# Patient Record
Sex: MALE | Race: WHITE | NOT HISPANIC OR LATINO | Employment: FULL TIME | URBAN - METROPOLITAN AREA
[De-identification: names, ages, dates, MRNs, and addresses within clinical notes are randomized per-mention and may not be internally consistent; named-entity substitution may affect disease eponyms.]

---

## 2018-01-30 ENCOUNTER — HOSPITAL ENCOUNTER (OUTPATIENT)
Dept: FAMILY MEDICINE CLINIC | Facility: CLINIC | Age: 38
Setting detail: SPECIMEN
Discharge: HOME | End: 2018-01-30
Attending: FAMILY MEDICINE | Admitting: FAMILY MEDICINE

## 2018-01-30 LAB
ALBUMIN SERPL-MCNC: 4 G/DL (ref 3.5–4.8)
ALBUMIN/GLOB SERPL: 1.2 {RATIO} (ref 1–1.7)
ALP SERPL-CCNC: 90 IU/L (ref 32–91)
ALT SERPL-CCNC: 36 IU/L (ref 17–63)
ANION GAP SERPL CALC-SCNC: 9.9 MMOL/L (ref 10–20)
AST SERPL-CCNC: 22 IU/L (ref 15–41)
BILIRUB SERPL-MCNC: 1 MG/DL (ref 0.3–1.2)
BUN SERPL-MCNC: 14 MG/DL (ref 8–20)
BUN/CREAT SERPL: 14 (ref 6.2–20.3)
CALCIUM SERPL-MCNC: 9.2 MG/DL (ref 8.9–10.3)
CHLORIDE SERPL-SCNC: 106 MMOL/L (ref 101–111)
CHOLEST SERPL-MCNC: 174 MG/DL
CHOLEST/HDLC SERPL: 6 {RATIO}
CONV CO2: 26 MMOL/L (ref 22–32)
CONV LDL CHOLESTEROL DIRECT: 82 MG/DL (ref 0–100)
CONV TOTAL PROTEIN: 7.4 G/DL (ref 6.1–7.9)
CREAT UR-MCNC: 1 MG/DL (ref 0.7–1.2)
GLOBULIN UR ELPH-MCNC: 3.4 G/DL (ref 2.5–3.8)
GLUCOSE SERPL-MCNC: 94 MG/DL (ref 65–99)
HDLC SERPL-MCNC: 29 MG/DL
LDLC/HDLC SERPL: 2.8 {RATIO}
LIPID INTERPRETATION: ABNORMAL
POTASSIUM SERPL-SCNC: 3.9 MMOL/L (ref 3.6–5.1)
SODIUM SERPL-SCNC: 138 MMOL/L (ref 136–144)
TRIGL SERPL-MCNC: 225 MG/DL
VLDLC SERPL CALC-MCNC: 63.3 MG/DL

## 2018-12-04 ENCOUNTER — CONVERSION ENCOUNTER (OUTPATIENT)
Dept: FAMILY MEDICINE CLINIC | Facility: CLINIC | Age: 38
End: 2018-12-04

## 2018-12-04 LAB
ALBUMIN SERPL-MCNC: 4.4 G/DL (ref 3.6–5.1)
ALBUMIN/GLOB SERPL: ABNORMAL {RATIO} (ref 1–2.5)
ALP SERPL-CCNC: 105 UNITS/L (ref 40–115)
ALT SERPL-CCNC: 21 UNITS/L (ref 9–46)
AST SERPL-CCNC: 16 UNITS/L (ref 10–40)
BILIRUB SERPL-MCNC: 0.7 MG/DL (ref 0.2–1.2)
BUN SERPL-MCNC: 17 MG/DL (ref 7–25)
BUN/CREAT SERPL: ABNORMAL (ref 6–22)
CALCIUM SERPL-MCNC: 9.5 MG/DL (ref 8.6–10.3)
CHLORIDE SERPL-SCNC: 104 MMOL/L (ref 98–110)
CHOLEST SERPL-MCNC: 197 MG/DL
CHOLEST/HDLC SERPL: ABNORMAL {RATIO}
CO2 CONTENT VENOUS: 29 MMOL/L (ref 20–32)
CONV TOTAL PROTEIN: 7.4 G/DL (ref 6.1–8.1)
CREAT UR-MCNC: 1.07 MG/DL (ref 0.6–1.35)
GLOBULIN UR ELPH-MCNC: ABNORMAL G/DL (ref 1.9–3.7)
GLUCOSE SERPL-MCNC: 95 MG/DL (ref 65–99)
HDLC SERPL-MCNC: 31 MG/DL
LDLC SERPL CALC-MCNC: ABNORMAL MG/DL
POTASSIUM SERPL-SCNC: 4.4 MMOL/L (ref 3.5–5.3)
SODIUM SERPL-SCNC: 141 MMOL/L (ref 135–146)
TRIGL SERPL-MCNC: 233 MG/DL

## 2020-01-16 ENCOUNTER — OFFICE VISIT (OUTPATIENT)
Dept: FAMILY MEDICINE CLINIC | Facility: CLINIC | Age: 40
End: 2020-01-16

## 2020-01-16 VITALS
BODY MASS INDEX: 28.19 KG/M2 | DIASTOLIC BLOOD PRESSURE: 82 MMHG | OXYGEN SATURATION: 98 % | HEART RATE: 74 BPM | HEIGHT: 71 IN | TEMPERATURE: 97.6 F | SYSTOLIC BLOOD PRESSURE: 124 MMHG | WEIGHT: 201.4 LBS

## 2020-01-16 DIAGNOSIS — Z00.00 ENCOUNTER FOR WELL ADULT EXAM WITHOUT ABNORMAL FINDINGS: Primary | ICD-10-CM

## 2020-01-16 DIAGNOSIS — G47.39 SLEEP APNEA-LIKE BEHAVIOR: ICD-10-CM

## 2020-01-16 PROCEDURE — 99395 PREV VISIT EST AGE 18-39: CPT | Performed by: FAMILY MEDICINE

## 2020-01-16 RX ORDER — PIMECROLIMUS 10 MG/G
CREAM TOPICAL
COMMUNITY
Start: 2011-09-13 | End: 2023-03-29

## 2020-01-16 NOTE — PROGRESS NOTES
"Subjective   Camron Rodriguez is a 39 y.o. male.     He is in today for annual well visit.  He has no significant concerns today.  He denies any issues with chest pain or difficulty breathing.  He is an avid runner and exercises often.  He feels he has good nutrition.  He denies any issues with bowel or bladder function.  He does have chronic IBS, so there are certain foods that will trigger some diarrhea, but it is still predictable and unchanged.  He thought he was due for colonoscopy this year but it is actually due next year.  He denies any issues with sleep or mood.  He denies any difficulties doing his activities of daily living.  His knees do pop occasionally with activity but there is no pain associated with it and he does not feel limited in his mobility.         /82 (BP Location: Right arm, Patient Position: Sitting, Cuff Size: Small Adult)   Pulse 74   Temp 97.6 °F (36.4 °C) (Oral)   Ht 180.3 cm (71\")   Wt 91.4 kg (201 lb 6.4 oz)   SpO2 98%   BMI 28.09 kg/m²       Chief Complaint   Patient presents with   • Annual Exam     He is in for his annual physical exam. Due for colonoscopy. C/o right knee popping but no pain            Current Outpatient Medications:   •  pimecrolimus (ELIDEL) 1 % cream, ELIDEL 1 % CREA, Disp: , Rfl:         The following portions of the patient's history were reviewed and updated as appropriate: allergies, current medications, past family history, past medical history, past social history, past surgical history and problem list.    Review of Systems   Constitutional: Negative for activity change, fatigue and fever.   HENT: Negative for congestion, sinus pressure, sinus pain, sore throat and trouble swallowing.    Eyes: Negative for visual disturbance.   Respiratory: Negative for chest tightness, shortness of breath and wheezing.    Cardiovascular: Negative for chest pain.   Gastrointestinal: Positive for diarrhea. Negative for abdominal distention, abdominal pain, " constipation, nausea and vomiting.   Genitourinary: Negative for difficulty urinating, dysuria, frequency and urgency.   Musculoskeletal: Negative for arthralgias, back pain and neck pain.   Neurological: Negative for dizziness, weakness, light-headedness, numbness and headaches.   Psychiatric/Behavioral: Negative for agitation, hallucinations, sleep disturbance and suicidal ideas.       Objective   Physical Exam   Constitutional: He is oriented to person, place, and time. No distress.   HENT:   Mouth/Throat: No oropharyngeal exudate.   Neck: Normal range of motion. Neck supple. No thyromegaly present.   Cardiovascular: Normal rate, regular rhythm and normal heart sounds.   No murmur heard.  Pulmonary/Chest: Effort normal and breath sounds normal. He has no wheezes.   Abdominal: Soft. Bowel sounds are normal. There is no guarding.   Musculoskeletal: Normal range of motion. He exhibits no edema or deformity.   Lymphadenopathy:     He has no cervical adenopathy.   Neurological: He is alert and oriented to person, place, and time. He displays normal reflexes.   Skin: Skin is warm. No rash noted.   Psychiatric: He has a normal mood and affect.   Nursing note and vitals reviewed.        Assessment/Plan   Problems Addressed this Visit     None      Visit Diagnoses     Encounter for well adult exam without abnormal findings    -  Primary    Relevant Orders    Comprehensive metabolic panel    Lipid panel    Sleep apnea-like behavior        Relevant Orders    Ambulatory Referral to Sleep Medicine    CBC w AUTO Differential        I have asked him to get a sleep study he said that he was having some apnea like issues that his wife had noticed even though he feels no symptoms  I will get appropriate labs  I will see him back in the office yearly for well visits and in between as needed

## 2020-02-04 ENCOUNTER — OFFICE VISIT (OUTPATIENT)
Dept: SLEEP MEDICINE | Facility: CLINIC | Age: 40
End: 2020-02-04

## 2020-02-04 VITALS
SYSTOLIC BLOOD PRESSURE: 121 MMHG | HEART RATE: 72 BPM | HEIGHT: 71 IN | OXYGEN SATURATION: 97 % | DIASTOLIC BLOOD PRESSURE: 79 MMHG | WEIGHT: 199 LBS | BODY MASS INDEX: 27.86 KG/M2

## 2020-02-04 DIAGNOSIS — G47.30 OBSERVED SLEEP APNEA: Primary | ICD-10-CM

## 2020-02-04 DIAGNOSIS — G47.10 HYPERSOMNIA: ICD-10-CM

## 2020-02-04 DIAGNOSIS — R06.83 SNORING: ICD-10-CM

## 2020-02-04 PROCEDURE — 99204 OFFICE O/P NEW MOD 45 MIN: CPT | Performed by: INTERNAL MEDICINE

## 2020-02-04 PROCEDURE — G0463 HOSPITAL OUTPT CLINIC VISIT: HCPCS

## 2020-02-04 NOTE — PROGRESS NOTES
[facility]  [address], [place] [address]  Phone   Fax       Camron Rodriguez  1980  39 y.o.  male      Referring Provider and PCP Camron Shields MD    Type of service: Initial consult  Date of service: 2/4/2020      Chief Complaint   Patient presents with   • Witnessed Apnea   • Snoring   • Daytime Sleepiness       History of present illness;  Thank you for asking to see Camron Rodriguez, 39 y.o.  for evaluation of sleep apnea. The patient was seen today on 2/4/2020 at [facility].   Patient reports that he has significant snoring and he has to sleep in a separate room because of the snoring.  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  He has a quite a bit of travel and he travels almost a week and a month.  Wife has noted that he stops breathing wakes up choking and gasping for breath    Patient gives the following sleep history.  Sleep schedule:  Bedtime: Midnight  Wake time: 7 AM  Normally takes about 5-10 minutes to fall asleep  Average hours of sleep 6-7  Number of naps per day yes  When patient wakes up still feels tired and not rested  Snoring; yes  Witnessed apneas; yes  Have you ever awakened gasping for breath, coughing, choking: Yes      Past Medical History:   Diagnosis Date   • Acute pharyngitis    • Anxiety    • Snoring    • Stool contents finding, abnormal        Social history:  Shift work: No  Tobacco use: No  Alcohol use: No  Caffeinated drinks: 1-2  Over-the-counter sleeping aid and medications: None  Narcotic medications: None    Family Hx  Family history of sleep apnea, mother has sleep apnea  Family History   Problem Relation Age of Onset   • Diabetes Maternal Grandmother    • Heart attack Maternal Grandfather         MI X2   • Other Maternal Grandfather         CABG   • Colon cancer Other    • Prostate cancer Other   "      Medications: reviewed    Current Outpatient Medications:   •  pimecrolimus (ELIDEL) 1 % cream, ELIDEL 1 % CREA, Disp: , Rfl:     Review of systems:  [place] Sleepiness Scale: Total score: 8   Positive for snoring, witnessed apneas, fatigue and daytime excessive sleepiness,   Negative for shortness of breath, cough, wheezing, chest pain, nausea and vomiting, palpitation, swelling of feet:    Morning headaches: No  Awaken with sore throat or dry mouth : Yes  Leg jerking at night: No  Crawly feeling/urge sensation to move in the legs: No  Teeth grinding: None  Sleepwalking, nightmares, muscle weakness with laughing or anger,sleep paralysis: None  Nasal Congestion: No  Nocturia (how many times/night): 1  Memory Problem: No  Change in weight, no    Physical exam:  Vitals:    02/04/20 0836   BP: 121/79   Pulse: 72   SpO2: 97%   Weight: 90.3 kg (199 lb)   Height: 180.3 cm (71\")    Body mass index is 27.75 kg/m². Neck Circumference: 15.75 inches  HEENT: Head is atraumatic, normocephalic   Eyes:pupils are round equal and reacting to light and accommodation, conjunctiva normal  Nose:no nasal septal defects or deviation and the nasal passages are clear, no nasal polyps,   Throat: tonsils are not enlarged, tongue normal size, oral airway Mallampati class 3  NECK: No lymphadenopathy, trachea is in the midline, thyroid not enlarged  RESPIRATORY SYSTEM: Breath sounds are equal on both sides, there are no wheezes or crackles  CARDIOVASULAR SYSTEM: Heart sounds are regular rhythm and vinicius rate, there are no murmurs or thrills  ABDOMEN: Soft, no hepatosplenomegaly, no evidence of ascites  EXTREMITES: No cyanosis, clubbing or edema   NEUROLOGICAL SYSTEM: Oriented x 3, no gross neurological defects, gait normal    Medical records and labs reviewed in [facility] reviewed    Assessment and plan:  · Sleep apnea unspecified, (G47.30) Patient's symptoms and examination is consistent with sleep apnea.  I have talked to the patient about the " signs and symptoms of sleep apnea and consequences of untreated sleep apnea. Discussed sleep testing.  I have placed a order in epic for a home sleep test.  Patient will have a follow-up after this sleep test is done.   · Overweight, patient's BMI is Body mass index is 27.75 kg/m².. I have discussed the relationship between weight and sleep apnea.There is direct correction between weight and severity of sleep apnea.  Weight reduction is encouraged. I have also discussed with the patient diet and exercise.  · Snoring secondary to sleep apnea  · Hypersomnia with [place] Sleepiness Scale of Total score: 8 due to sleep apnea.      I once again thank you for asking me to see this patient in consultation and I have forwarded my opinion and treatment plan.  Please do not hesitate to call me if you have any questions.     Sirisha Kelley MD, [facility]  Sleep Medicine.(Board-certified)  [facility]  Medical Director for [facility]  2/4/2020 ,

## 2020-02-15 ENCOUNTER — HOSPITAL ENCOUNTER (OUTPATIENT)
Dept: SLEEP MEDICINE | Facility: HOSPITAL | Age: 40
Discharge: HOME | End: 2020-02-15
Admitting: INTERNAL MEDICINE

## 2020-02-15 VITALS — OXYGEN SATURATION: 96 % | HEART RATE: 86 BPM | RESPIRATION RATE: 12 BRPM

## 2020-02-15 DIAGNOSIS — G47.10 HYPERSOMNIA: ICD-10-CM

## 2020-02-15 DIAGNOSIS — G47.30 OBSERVED SLEEP APNEA: ICD-10-CM

## 2020-02-15 DIAGNOSIS — R06.83 SNORING: ICD-10-CM

## 2020-02-15 PROCEDURE — 95806 SLEEP STUDY UNATT&RESP EFFT: CPT | Performed by: INTERNAL MEDICINE

## 2020-02-15 PROCEDURE — 95806 SLEEP STUDY UNATT&RESP EFFT: CPT

## 2020-02-18 DIAGNOSIS — G47.33 OSA (OBSTRUCTIVE SLEEP APNEA): Primary | ICD-10-CM

## 2020-02-18 DIAGNOSIS — R06.83 SNORING: ICD-10-CM

## 2020-02-20 LAB
ALBUMIN SERPL-MCNC: 4.3 G/DL (ref 3.5–5.2)
ALBUMIN/GLOB SERPL: 1.4 G/DL
ALP SERPL-CCNC: 97 U/L (ref 39–117)
ALT SERPL W P-5'-P-CCNC: 24 U/L (ref 1–41)
ANION GAP SERPL CALCULATED.3IONS-SCNC: 13.3 MMOL/L (ref 5–15)
AST SERPL-CCNC: 21 U/L (ref 1–40)
BASOPHILS # BLD AUTO: 0.03 10*3/MM3 (ref 0–0.2)
BASOPHILS NFR BLD AUTO: 0.5 % (ref 0–1.5)
BILIRUB SERPL-MCNC: 0.5 MG/DL (ref 0.2–1.2)
BUN BLD-MCNC: 18 MG/DL (ref 6–20)
BUN/CREAT SERPL: 17.3 (ref 7–25)
CALCIUM SPEC-SCNC: 9 MG/DL (ref 8.6–10.5)
CHLORIDE SERPL-SCNC: 103 MMOL/L (ref 98–107)
CHOLEST SERPL-MCNC: 201 MG/DL (ref 0–200)
CO2 SERPL-SCNC: 24.7 MMOL/L (ref 22–29)
CREAT BLD-MCNC: 1.04 MG/DL (ref 0.76–1.27)
DEPRECATED RDW RBC AUTO: 40 FL (ref 37–54)
EOSINOPHIL # BLD AUTO: 0.07 10*3/MM3 (ref 0–0.4)
EOSINOPHIL NFR BLD AUTO: 1.1 % (ref 0.3–6.2)
ERYTHROCYTE [DISTWIDTH] IN BLOOD BY AUTOMATED COUNT: 12.3 % (ref 12.3–15.4)
GFR SERPL CREATININE-BSD FRML MDRD: 79 ML/MIN/1.73
GLOBULIN UR ELPH-MCNC: 3.1 GM/DL
GLUCOSE BLD-MCNC: 96 MG/DL (ref 65–99)
HCT VFR BLD AUTO: 44.2 % (ref 37.5–51)
HDLC SERPL-MCNC: 30 MG/DL (ref 40–60)
HGB BLD-MCNC: 15.4 G/DL (ref 13–17.7)
IMM GRANULOCYTES # BLD AUTO: 0.03 10*3/MM3 (ref 0–0.05)
IMM GRANULOCYTES NFR BLD AUTO: 0.5 % (ref 0–0.5)
LDLC SERPL CALC-MCNC: 138 MG/DL (ref 0–100)
LDLC/HDLC SERPL: 4.61 {RATIO}
LYMPHOCYTES # BLD AUTO: 2.36 10*3/MM3 (ref 0.7–3.1)
LYMPHOCYTES NFR BLD AUTO: 36.5 % (ref 19.6–45.3)
MCH RBC QN AUTO: 31.6 PG (ref 26.6–33)
MCHC RBC AUTO-ENTMCNC: 34.8 G/DL (ref 31.5–35.7)
MCV RBC AUTO: 90.6 FL (ref 79–97)
MONOCYTES # BLD AUTO: 0.66 10*3/MM3 (ref 0.1–0.9)
MONOCYTES NFR BLD AUTO: 10.2 % (ref 5–12)
NEUTROPHILS # BLD AUTO: 3.31 10*3/MM3 (ref 1.7–7)
NEUTROPHILS NFR BLD AUTO: 51.2 % (ref 42.7–76)
NRBC BLD AUTO-RTO: 0 /100 WBC (ref 0–0.2)
PLATELET # BLD AUTO: 276 10*3/MM3 (ref 140–450)
PMV BLD AUTO: 10 FL (ref 6–12)
POTASSIUM BLD-SCNC: 3.7 MMOL/L (ref 3.5–5.2)
PROT SERPL-MCNC: 7.4 G/DL (ref 6–8.5)
RBC # BLD AUTO: 4.88 10*6/MM3 (ref 4.14–5.8)
SODIUM BLD-SCNC: 141 MMOL/L (ref 136–145)
TRIGL SERPL-MCNC: 163 MG/DL (ref 0–150)
VLDLC SERPL-MCNC: 32.6 MG/DL (ref 5–40)
WBC NRBC COR # BLD: 6.46 10*3/MM3 (ref 3.4–10.8)

## 2020-02-20 PROCEDURE — 80061 LIPID PANEL: CPT | Performed by: FAMILY MEDICINE

## 2020-02-20 PROCEDURE — 85025 COMPLETE CBC W/AUTO DIFF WBC: CPT | Performed by: FAMILY MEDICINE

## 2020-02-20 PROCEDURE — 80053 COMPREHEN METABOLIC PANEL: CPT | Performed by: FAMILY MEDICINE

## 2020-02-20 PROCEDURE — 36415 COLL VENOUS BLD VENIPUNCTURE: CPT | Performed by: FAMILY MEDICINE

## 2020-02-23 RX ORDER — ROSUVASTATIN CALCIUM 10 MG/1
10 TABLET, COATED ORAL DAILY
Qty: 90 TABLET | Refills: 1 | Status: SHIPPED | OUTPATIENT
Start: 2020-02-23 | End: 2020-09-30

## 2020-06-05 ENCOUNTER — TELEPHONE (OUTPATIENT)
Dept: FAMILY MEDICINE CLINIC | Facility: CLINIC | Age: 40
End: 2020-06-05

## 2020-07-14 ENCOUNTER — OFFICE VISIT (OUTPATIENT)
Dept: SLEEP MEDICINE | Facility: CLINIC | Age: 40
End: 2020-07-14

## 2020-07-14 VITALS
SYSTOLIC BLOOD PRESSURE: 108 MMHG | HEART RATE: 58 BPM | HEIGHT: 71 IN | OXYGEN SATURATION: 99 % | DIASTOLIC BLOOD PRESSURE: 75 MMHG | WEIGHT: 198 LBS | BODY MASS INDEX: 27.72 KG/M2

## 2020-07-14 DIAGNOSIS — Z99.89 OSA ON CPAP: Primary | ICD-10-CM

## 2020-07-14 DIAGNOSIS — G47.33 OSA ON CPAP: Primary | ICD-10-CM

## 2020-07-14 PROBLEM — G47.10 HYPERSOMNIA: Status: RESOLVED | Noted: 2020-02-04 | Resolved: 2020-07-14

## 2020-07-14 PROBLEM — R06.83 SNORING: Status: RESOLVED | Noted: 2020-02-04 | Resolved: 2020-07-14

## 2020-07-14 PROCEDURE — 99213 OFFICE O/P EST LOW 20 MIN: CPT | Performed by: INTERNAL MEDICINE

## 2020-07-14 PROCEDURE — G0463 HOSPITAL OUTPT CLINIC VISIT: HCPCS

## 2020-07-14 NOTE — PROGRESS NOTES
"  [facility]  [address], [place] [address]  Phone   Fax       SLEEP CLINIC FOLLOW UP PROGRESS NOTE.    Camron Rodriguez  1980  40 y.o.  male      PCP: Camron Shields MD      Date of visit: 7/14/2020    Chief Complaint   Patient presents with   • Follow-up   • Sleep Apnea       INTERM HISTORY:  This is a 40 y.o. years old patient who has a history of sleep apnea and is on CPAP.  Patient reports good compliance with the device.  Patient tolerated the difference after starting CPAP    Sleep schedule  Normally goes to bed at midnight  Wakes up at 8 AM  Do you feel refreshed after waking up ?: yes      The Smart card downloaded on 7/14/2020 has been reviewed by me and shows the following..  Compliance; 79 %  > 4 hr use, 77 %  Average use of the device 7 hours and 27 per night  Residual AHI: 9.3 /hr (normal less than 5)  Mask type: Face mask dream wear  DME: Leila Smith      Past Medical History:   Diagnosis Date   • Acute pharyngitis    • Anxiety    • HELDER on CPAP     AHI:16.5   • Stool contents finding, abnormal        MEDICATIONS: reviewed by me    Current Outpatient Medications:   •  pimecrolimus (ELIDEL) 1 % cream, ELIDEL 1 % CREA, Disp: , Rfl:   •  rosuvastatin (CRESTOR) 10 MG tablet, Take 1 tablet by mouth Daily., Disp: 90 tablet, Rfl: 1    No Known Allergies reviewed by me    SOCIAL, FAMILY HISTORY: Medical records are reviewed and noted by me.  History of smoking NO  History of alcohol use yes  Caffeine use 1-2    REVIEW OF SYSTEMS:   [place] Sleepiness Scale :Total score: 3   Snoring: Resolved  Morning headache: No  Nasal congestion: No  Leg movements: No  Number of times you wake up once asleep: None  Heart burn no    PHYSICAL EXAMINATION:  Vitals:    07/14/20 0830   BP: 108/75   Pulse: 58   SpO2: 99%   Weight: 89.8 kg (198 lb)   Height: 180.3 cm (71\")    Body mass index is 27.62 kg/m².    HEENT: pupils are round equal and reacting to light " and accommodation, nasal passage is clear, no nasal polyps, no lymphadenopathy, throat is clear, oral airway Mallampati class 3  RESPRATORY SYSTEM: Breath sounds are equal on both sides and are normal, no wheezes or crackles  CARDIOVASULAR SYSTEM: Heart rate is regular without murmur  ABDOMEN: Soft, no ascites, no hepatosplenomegaly.  EXTREMITIES: No cyanosis, clubbing or edema       ASSESSMENT AND PLAN:  · Obstructive sleep apnea, patient is using the device with good compliance for treatment of sleep apnea.  I have reviewed the smart card down load and discussed with the patient the download data and encouarged the patient to continue to use the device.  The symptoms have improved and the residual AHI is acceptable.  The device is benefiting the patient and the device is medically necessary. The patient is also instructed to get the supplies from the [employer] company and a prescription for supplies has been sent to the DME company.  AHI is slightly high at 9.3.  The leak is minimal.  I have asked the patient to watch for few weeks and if this number is still consistently high he is a instructed to call so that they can increase the pressure  · Return to clinic in 12 months for follow-up        Sirisha Kelley MD, [facility]  Sleep Medicine.(Board-certified)  [facility]  Medical Director for [facility]  7/14/2020

## 2020-09-30 RX ORDER — ROSUVASTATIN CALCIUM 10 MG/1
10 TABLET, COATED ORAL DAILY
Qty: 90 TABLET | Refills: 1 | Status: SHIPPED | OUTPATIENT
Start: 2020-09-30 | End: 2023-04-02 | Stop reason: SDUPTHER

## 2021-01-19 ENCOUNTER — OFFICE VISIT (OUTPATIENT)
Dept: FAMILY MEDICINE CLINIC | Facility: CLINIC | Age: 41
End: 2021-01-19

## 2021-01-19 VITALS
HEART RATE: 89 BPM | TEMPERATURE: 97.8 F | SYSTOLIC BLOOD PRESSURE: 136 MMHG | WEIGHT: 199 LBS | OXYGEN SATURATION: 96 % | DIASTOLIC BLOOD PRESSURE: 79 MMHG | BODY MASS INDEX: 27.86 KG/M2 | HEIGHT: 71 IN

## 2021-01-19 DIAGNOSIS — E78.5 HYPERLIPIDEMIA, UNSPECIFIED HYPERLIPIDEMIA TYPE: ICD-10-CM

## 2021-01-19 DIAGNOSIS — Z00.00 ENCOUNTER FOR WELL ADULT EXAM WITHOUT ABNORMAL FINDINGS: Primary | ICD-10-CM

## 2021-01-19 PROCEDURE — 99396 PREV VISIT EST AGE 40-64: CPT | Performed by: FAMILY MEDICINE

## 2021-01-19 NOTE — PROGRESS NOTES
"Subjective   Camron Rodriguez is a 40 y.o. male.     He is in for his annual well visit. He has not had any immediate concerns. He is due for labs to monitor cholesterol. He is also due for colonoscopy for surveillance of polyps; he has the order already and just has not scheduled it yet.         /79 (BP Location: Left arm, Patient Position: Sitting, Cuff Size: Large Adult)   Pulse 89   Temp 97.8 °F (36.6 °C) (Temporal)   Ht 180 cm (70.87\")   Wt 90.3 kg (199 lb)   SpO2 96%   BMI 27.86 kg/m²       Chief Complaint   Patient presents with   • Annual Exam     due for a colo 5 yrs since the last one - had COVID back on 11/01/2020           Current Outpatient Medications:   •  pimecrolimus (ELIDEL) 1 % cream, ELIDEL 1 % CREA, Disp: , Rfl:   •  rosuvastatin (CRESTOR) 10 MG tablet, TAKE 1 TABLET BY MOUTH DAILY, Disp: 90 tablet, Rfl: 1        The following portions of the patient's history were reviewed and updated as appropriate: allergies, current medications, past family history, past medical history, past social history, past surgical history and problem list.    Review of Systems   Constitutional: Negative for activity change, fatigue and fever.   HENT: Negative for congestion, sinus pressure, sinus pain, sore throat and trouble swallowing.    Eyes: Negative for visual disturbance.   Respiratory: Negative for chest tightness, shortness of breath and wheezing.    Cardiovascular: Negative for chest pain.   Gastrointestinal: Negative for abdominal distention, abdominal pain, constipation, diarrhea, nausea and vomiting.   Genitourinary: Negative for difficulty urinating, dysuria, frequency and urgency.   Musculoskeletal: Negative for arthralgias, back pain and neck pain.   Skin: Negative for rash.   Neurological: Negative for dizziness, weakness, light-headedness, numbness and headaches.   Psychiatric/Behavioral: Negative for agitation, hallucinations, sleep disturbance and suicidal ideas.       Objective "   Physical Exam  Vitals signs and nursing note reviewed.   Constitutional:       Appearance: Normal appearance.   HENT:      Right Ear: Tympanic membrane, ear canal and external ear normal.      Left Ear: Tympanic membrane, ear canal and external ear normal.      Nose: Nose normal. No congestion.      Mouth/Throat:      Pharynx: Oropharynx is clear. No oropharyngeal exudate.   Eyes:      Conjunctiva/sclera: Conjunctivae normal.      Pupils: Pupils are equal, round, and reactive to light.   Neck:      Musculoskeletal: Neck supple.   Cardiovascular:      Rate and Rhythm: Normal rate and regular rhythm.      Heart sounds: Normal heart sounds.   Pulmonary:      Effort: Pulmonary effort is normal.      Breath sounds: Normal breath sounds. No wheezing or rales.   Abdominal:      General: Bowel sounds are normal.      Palpations: Abdomen is soft.   Musculoskeletal:         General: No swelling or signs of injury.      Right lower leg: No edema.      Left lower leg: No edema.   Skin:     Findings: No rash.   Neurological:      General: No focal deficit present.      Mental Status: He is alert and oriented to person, place, and time.      Deep Tendon Reflexes: Reflexes normal.   Psychiatric:         Mood and Affect: Mood normal.           Assessment/Plan   Problems Addressed this Visit     None      Visit Diagnoses     Encounter for well adult exam without abnormal findings    -  Primary    Hyperlipidemia, unspecified hyperlipidemia type        Relevant Orders    Comprehensive metabolic panel    Lipid panel      Diagnoses       Codes Comments    Encounter for well adult exam without abnormal findings    -  Primary ICD-10-CM: Z00.00  ICD-9-CM: V70.0     Hyperlipidemia, unspecified hyperlipidemia type     ICD-10-CM: E78.5  ICD-9-CM: 272.4         I will follow up on labs  I advised him to remain active and call for concerns  He is to see me yearly for well visits and as needed in the interim  He was advised to get his  colonoscopy soon and get a Covid vaccine when offered, likely this spring or summer

## 2021-01-29 ENCOUNTER — LAB (OUTPATIENT)
Dept: FAMILY MEDICINE CLINIC | Facility: CLINIC | Age: 41
End: 2021-01-29

## 2021-01-29 LAB
ALBUMIN SERPL-MCNC: 4.5 G/DL (ref 3.5–5.2)
ALBUMIN/GLOB SERPL: 1.3 G/DL
ALP SERPL-CCNC: 97 U/L (ref 39–117)
ALT SERPL W P-5'-P-CCNC: 28 U/L (ref 1–41)
ANION GAP SERPL CALCULATED.3IONS-SCNC: 12.1 MMOL/L (ref 5–15)
AST SERPL-CCNC: 18 U/L (ref 1–40)
BILIRUB SERPL-MCNC: 0.7 MG/DL (ref 0–1.2)
BUN SERPL-MCNC: 15 MG/DL (ref 6–20)
BUN/CREAT SERPL: 13.5 (ref 7–25)
CALCIUM SPEC-SCNC: 9.8 MG/DL (ref 8.6–10.5)
CHLORIDE SERPL-SCNC: 102 MMOL/L (ref 98–107)
CHOLEST SERPL-MCNC: 159 MG/DL (ref 0–200)
CO2 SERPL-SCNC: 27.9 MMOL/L (ref 22–29)
CREAT SERPL-MCNC: 1.11 MG/DL (ref 0.76–1.27)
GFR SERPL CREATININE-BSD FRML MDRD: 73 ML/MIN/1.73
GLOBULIN UR ELPH-MCNC: 3.4 GM/DL
GLUCOSE SERPL-MCNC: 106 MG/DL (ref 65–99)
HDLC SERPL-MCNC: 34 MG/DL (ref 40–60)
LDLC SERPL CALC-MCNC: 105 MG/DL (ref 0–100)
LDLC/HDLC SERPL: 3.03 {RATIO}
POTASSIUM SERPL-SCNC: 4.2 MMOL/L (ref 3.5–5.2)
PROT SERPL-MCNC: 7.9 G/DL (ref 6–8.5)
SODIUM SERPL-SCNC: 142 MMOL/L (ref 136–145)
TRIGL SERPL-MCNC: 110 MG/DL (ref 0–150)
VLDLC SERPL-MCNC: 20 MG/DL (ref 5–40)

## 2021-01-29 PROCEDURE — 80061 LIPID PANEL: CPT | Performed by: FAMILY MEDICINE

## 2021-01-29 PROCEDURE — 36415 COLL VENOUS BLD VENIPUNCTURE: CPT | Performed by: FAMILY MEDICINE

## 2021-01-29 PROCEDURE — 80053 COMPREHEN METABOLIC PANEL: CPT | Performed by: FAMILY MEDICINE

## 2021-02-02 ENCOUNTER — TELEPHONE (OUTPATIENT)
Dept: FAMILY MEDICINE CLINIC | Facility: CLINIC | Age: 41
End: 2021-02-02

## 2021-02-02 NOTE — TELEPHONE ENCOUNTER
Spoke to Camron and informed him the wellness form is completed. He requested it me E-mailed to him at [email or web address].

## 2021-04-28 ENCOUNTER — OFFICE (AMBULATORY)
Dept: URBAN - METROPOLITAN AREA PATHOLOGY 4 | Facility: PATHOLOGY | Age: 41
End: 2021-04-28

## 2021-04-28 ENCOUNTER — ON CAMPUS - OUTPATIENT (AMBULATORY)
Dept: URBAN - METROPOLITAN AREA HOSPITAL 2 | Facility: HOSPITAL | Age: 41
End: 2021-04-28

## 2021-04-28 ENCOUNTER — OFFICE (AMBULATORY)
Dept: URBAN - METROPOLITAN AREA PATHOLOGY 4 | Facility: PATHOLOGY | Age: 41
End: 2021-04-28
Payer: COMMERCIAL

## 2021-04-28 VITALS
OXYGEN SATURATION: 100 % | DIASTOLIC BLOOD PRESSURE: 72 MMHG | HEIGHT: 71 IN | DIASTOLIC BLOOD PRESSURE: 59 MMHG | HEART RATE: 69 BPM | SYSTOLIC BLOOD PRESSURE: 101 MMHG | HEART RATE: 77 BPM | DIASTOLIC BLOOD PRESSURE: 68 MMHG | OXYGEN SATURATION: 95 % | OXYGEN SATURATION: 94 % | DIASTOLIC BLOOD PRESSURE: 63 MMHG | SYSTOLIC BLOOD PRESSURE: 115 MMHG | OXYGEN SATURATION: 99 % | WEIGHT: 193 LBS | SYSTOLIC BLOOD PRESSURE: 96 MMHG | SYSTOLIC BLOOD PRESSURE: 108 MMHG | DIASTOLIC BLOOD PRESSURE: 75 MMHG | HEART RATE: 71 BPM | RESPIRATION RATE: 16 BRPM | SYSTOLIC BLOOD PRESSURE: 114 MMHG | SYSTOLIC BLOOD PRESSURE: 128 MMHG | OXYGEN SATURATION: 96 % | DIASTOLIC BLOOD PRESSURE: 88 MMHG | HEART RATE: 78 BPM | HEART RATE: 79 BPM | HEART RATE: 86 BPM | SYSTOLIC BLOOD PRESSURE: 103 MMHG | RESPIRATION RATE: 18 BRPM | TEMPERATURE: 98 F

## 2021-04-28 DIAGNOSIS — K63.5 POLYP OF COLON: ICD-10-CM

## 2021-04-28 DIAGNOSIS — K52.9 NONINFECTIVE GASTROENTERITIS AND COLITIS, UNSPECIFIED: ICD-10-CM

## 2021-04-28 DIAGNOSIS — D12.8 BENIGN NEOPLASM OF RECTUM: ICD-10-CM

## 2021-04-28 DIAGNOSIS — K57.30 DIVERTICULOSIS OF LARGE INTESTINE WITHOUT PERFORATION OR ABS: ICD-10-CM

## 2021-04-28 DIAGNOSIS — D12.2 BENIGN NEOPLASM OF ASCENDING COLON: ICD-10-CM

## 2021-04-28 DIAGNOSIS — K62.1 RECTAL POLYP: ICD-10-CM

## 2021-04-28 DIAGNOSIS — R19.7 DIARRHEA, UNSPECIFIED: ICD-10-CM

## 2021-04-28 LAB
GI HISTOLOGY: A. UNSPECIFIED: (no result)
GI HISTOLOGY: B. UNSPECIFIED: (no result)
GI HISTOLOGY: C. UNSPECIFIED: (no result)
GI HISTOLOGY: PDF REPORT: (no result)

## 2021-04-28 PROCEDURE — 45380 COLONOSCOPY AND BIOPSY: CPT | Performed by: INTERNAL MEDICINE

## 2021-04-28 PROCEDURE — 88305 TISSUE EXAM BY PATHOLOGIST: CPT | Mod: 26 | Performed by: INTERNAL MEDICINE

## 2021-11-12 ENCOUNTER — TELEPHONE (OUTPATIENT)
Dept: FAMILY MEDICINE CLINIC | Facility: CLINIC | Age: 41
End: 2021-11-12

## 2021-11-12 NOTE — TELEPHONE ENCOUNTER
Caller: Camron Rodriguez    Relationship to patient: Self    Best call back number: [phone]     Chief complaint: PHYSICAL    Type of visit: PHYSICAL    Requested date: NOV, DEC BEFORE END OF YEAR    If rescheduling, when is the original appointment: N/A    Additional notes: PATIENT TRIED TO SCHEDULE A PHYSICAL AND I INFORMED HIM HE WASN'T DUE FOR A PHYSICAL UNTIL 1-2022 AND HE STATED HE STILL WANTS ONE AND WILL PAY OUT OF POCKET. PLEASE CALL AND ADVISE.

## 2021-12-06 ENCOUNTER — LAB (OUTPATIENT)
Dept: FAMILY MEDICINE CLINIC | Facility: CLINIC | Age: 41
End: 2021-12-06

## 2021-12-06 ENCOUNTER — OFFICE VISIT (OUTPATIENT)
Dept: FAMILY MEDICINE CLINIC | Facility: CLINIC | Age: 41
End: 2021-12-06

## 2021-12-06 VITALS
TEMPERATURE: 99.5 F | HEIGHT: 71 IN | SYSTOLIC BLOOD PRESSURE: 119 MMHG | HEART RATE: 77 BPM | RESPIRATION RATE: 16 BRPM | OXYGEN SATURATION: 99 % | DIASTOLIC BLOOD PRESSURE: 80 MMHG | BODY MASS INDEX: 28.7 KG/M2 | WEIGHT: 205 LBS

## 2021-12-06 DIAGNOSIS — Z23 NEED FOR VACCINATION: ICD-10-CM

## 2021-12-06 DIAGNOSIS — Z00.00 ROUTINE PHYSICAL EXAMINATION: Primary | ICD-10-CM

## 2021-12-06 LAB
ALBUMIN SERPL-MCNC: 4.6 G/DL (ref 3.5–5.2)
ALBUMIN/GLOB SERPL: 1.6 G/DL
ALP SERPL-CCNC: 94 U/L (ref 39–117)
ALT SERPL W P-5'-P-CCNC: 31 U/L (ref 1–41)
ANION GAP SERPL CALCULATED.3IONS-SCNC: 9.9 MMOL/L (ref 5–15)
AST SERPL-CCNC: 19 U/L (ref 1–40)
BILIRUB SERPL-MCNC: 0.5 MG/DL (ref 0–1.2)
BUN SERPL-MCNC: 14 MG/DL (ref 6–20)
BUN/CREAT SERPL: 14 (ref 7–25)
CALCIUM SPEC-SCNC: 9.7 MG/DL (ref 8.6–10.5)
CHLORIDE SERPL-SCNC: 104 MMOL/L (ref 98–107)
CHOLEST SERPL-MCNC: 181 MG/DL (ref 0–200)
CO2 SERPL-SCNC: 28.1 MMOL/L (ref 22–29)
CREAT SERPL-MCNC: 1 MG/DL (ref 0.76–1.27)
GFR SERPL CREATININE-BSD FRML MDRD: 82 ML/MIN/1.73
GLOBULIN UR ELPH-MCNC: 2.9 GM/DL
GLUCOSE SERPL-MCNC: 91 MG/DL (ref 65–99)
HCV AB SER DONR QL: NORMAL
HDLC SERPL-MCNC: 29 MG/DL (ref 40–60)
LDLC SERPL CALC-MCNC: 107 MG/DL (ref 0–100)
LDLC/HDLC SERPL: 3.43 {RATIO}
POTASSIUM SERPL-SCNC: 4 MMOL/L (ref 3.5–5.2)
PROT SERPL-MCNC: 7.5 G/DL (ref 6–8.5)
SODIUM SERPL-SCNC: 142 MMOL/L (ref 136–145)
TRIGL SERPL-MCNC: 263 MG/DL (ref 0–150)
VLDLC SERPL-MCNC: 45 MG/DL (ref 5–40)

## 2021-12-06 PROCEDURE — 80053 COMPREHEN METABOLIC PANEL: CPT | Performed by: PHYSICIAN ASSISTANT

## 2021-12-06 PROCEDURE — 99396 PREV VISIT EST AGE 40-64: CPT | Performed by: PHYSICIAN ASSISTANT

## 2021-12-06 PROCEDURE — 36415 COLL VENOUS BLD VENIPUNCTURE: CPT | Performed by: PHYSICIAN ASSISTANT

## 2021-12-06 PROCEDURE — 90686 IIV4 VACC NO PRSV 0.5 ML IM: CPT | Performed by: PHYSICIAN ASSISTANT

## 2021-12-06 PROCEDURE — 90471 IMMUNIZATION ADMIN: CPT | Performed by: PHYSICIAN ASSISTANT

## 2021-12-06 PROCEDURE — 80061 LIPID PANEL: CPT | Performed by: PHYSICIAN ASSISTANT

## 2021-12-06 PROCEDURE — 86803 HEPATITIS C AB TEST: CPT | Performed by: PHYSICIAN ASSISTANT

## 2021-12-06 PROCEDURE — 90715 TDAP VACCINE 7 YRS/> IM: CPT | Performed by: PHYSICIAN ASSISTANT

## 2021-12-06 PROCEDURE — 90472 IMMUNIZATION ADMIN EACH ADD: CPT | Performed by: PHYSICIAN ASSISTANT

## 2021-12-06 NOTE — PROGRESS NOTES
Subjective   Camron Rodriguez is a 41 y.o. male.     Pt presents for annual routine physical.  Pt is aware that it has not been a full year since his last physical but still would like to do physical.  He has hx of HELDER and is in CPAP machine. He sleeps much better with his machine and uses it nightly.  He had colonoscopy earlier this year and had benign polyps removed. He will be due to repeat in 5 years.  He is taking his cholesterol medication daily and trying to work on increasing exercise.  He is working on diet but sometimes isn't the best.             The following portions of the patient's history were reviewed and updated as appropriate: allergies, current medications, past family history, past medical history, past social history, past surgical history and problem list.  Past Medical History:   Diagnosis Date   • Acute pharyngitis    • Anxiety    • HELDER on CPAP     AHI:16.5   • Stool contents finding, abnormal      Past Surgical History:   Procedure Laterality Date   • COLONOSCOPY     • TYMPANOPLASTY     • VASECTOMY       Family History   Problem Relation Age of Onset   • Diabetes Maternal Grandmother    • Heart attack Maternal Grandfather         MI X2   • Other Maternal Grandfather         CABG   • Colon cancer Other    • Prostate cancer Other      Social History     Socioeconomic History   • Marital status:    Tobacco Use   • Smoking status: Never Smoker   • Smokeless tobacco: Never Used   Substance and Sexual Activity   • Alcohol use: Yes     Comment: social drinker    • Drug use: Never         Current Outpatient Medications:   •  pimecrolimus (ELIDEL) 1 % cream, ELIDEL 1 % CREA, Disp: , Rfl:   •  rosuvastatin (CRESTOR) 10 MG tablet, TAKE 1 TABLET BY MOUTH DAILY, Disp: 90 tablet, Rfl: 1    Review of Systems   Constitutional: Negative for activity change, appetite change, chills, diaphoresis, fatigue, fever, unexpected weight gain and unexpected weight loss.   HENT: Negative for trouble swallowing.   "  Eyes: Negative for blurred vision and visual disturbance.   Respiratory: Negative for chest tightness and shortness of breath.    Cardiovascular: Negative for chest pain, palpitations and leg swelling.   Gastrointestinal: Negative for abdominal pain, anal bleeding, blood in stool, constipation, diarrhea, nausea and vomiting.   Genitourinary: Negative for decreased urine volume, difficulty urinating, dysuria, hematuria and nocturia.   Musculoskeletal: Negative for gait problem.   Neurological: Negative for weakness, light-headedness, headache, memory problem and confusion.   Psychiatric/Behavioral: Negative for sleep disturbance.     /80 (BP Location: Right arm, Patient Position: Sitting, Cuff Size: Large Adult)   Pulse 77   Temp 99.5 °F (37.5 °C) (Temporal)   Resp 16   Ht 180 cm (70.87\")   Wt 93 kg (205 lb)   SpO2 99%   BMI 28.70 kg/m²       Objective   Physical Exam  Vitals and nursing note reviewed.   Constitutional:       General: He is not in acute distress.     Appearance: Normal appearance. He is overweight. He is not ill-appearing.   HENT:      Head: Normocephalic and atraumatic.      Right Ear: Tympanic membrane, ear canal and external ear normal.      Left Ear: Tympanic membrane, ear canal and external ear normal.      Nose: Nose normal.      Mouth/Throat:      Mouth: Mucous membranes are moist.      Pharynx: Oropharynx is clear.   Eyes:      Conjunctiva/sclera: Conjunctivae normal.      Pupils: Pupils are equal, round, and reactive to light.   Neck:      Thyroid: No thyroid mass, thyromegaly or thyroid tenderness.      Vascular: No carotid bruit.   Cardiovascular:      Rate and Rhythm: Normal rate and regular rhythm.      Heart sounds: Normal heart sounds.   Pulmonary:      Effort: Pulmonary effort is normal.      Breath sounds: Normal breath sounds.   Abdominal:      General: Abdomen is flat. Bowel sounds are normal.      Palpations: Abdomen is soft.   Musculoskeletal:      Cervical back: " Normal range of motion and neck supple.      Right lower leg: No edema.      Left lower leg: No edema.   Lymphadenopathy:      Cervical: No cervical adenopathy.   Skin:     General: Skin is warm and dry.   Neurological:      General: No focal deficit present.      Mental Status: He is alert and oriented to person, place, and time.   Psychiatric:         Mood and Affect: Mood normal.         Behavior: Behavior normal.         Thought Content: Thought content normal.         Judgment: Judgment normal.         Procedures     Assessment/Plan   Diagnoses and all orders for this visit:    1. Routine physical examination (Primary)  Comments:  Work on increasing exercise to at least 20 minutes daily and work on low saturated fat diet. Continue crestor.  Orders:  -     Comprehensive Metabolic Panel  -     Lipid Panel  -     Hepatitis C Antibody    2. Need for vaccination  Comments:  Pt given influenza and tdap vaccinations.  Orders:  -     FluLaval/Fluarix/Fluzone >6 Months ([id number])  -     Tdap Vaccine Greater Than or Equal To 6yo IM

## 2022-05-18 NOTE — PROGRESS NOTES
Subjective   Camron Rodriguez is a 42 y.o. male.       HPI   Pt is here today for follow up on an abnormal EKG.   Is currently in a study with Dermatology and they completed an EKG on him on 5/17/22.  The reading showed a sinus rhythm with intraventricular conduction delay.  They did send this to cardiology for interpretation; was told it could be an AV block.    Reports an occasional heart palpitation over the past year but no other symptoms.  About three times this past year he has had episodes of dizziness (lasting about 20 minutes).    Denies any CP,SOA, headache, trouble with vision, N/V.          The following portions of the patient's history were reviewed and updated as appropriate: allergies, current medications, past family history, past medical history, past social history, past surgical history and problem list.    Review of Systems   Constitutional: Negative for activity change, appetite change, chills, diaphoresis, fatigue and fever.   Eyes: Negative for visual disturbance.   Respiratory: Negative for cough, chest tightness, shortness of breath and wheezing.    Cardiovascular: Negative for chest pain and palpitations.   Gastrointestinal: Negative for diarrhea, nausea, vomiting and indigestion.   Endocrine: Negative for cold intolerance and heat intolerance.   Genitourinary: Negative for dysuria, flank pain, frequency, hematuria and urgency.   Neurological: Negative for dizziness, weakness, numbness and headache.   Psychiatric/Behavioral: Negative for depressed mood. The patient is not nervous/anxious.        Objective   Physical Exam  Vitals reviewed.   Constitutional:       General: He is not in acute distress.     Appearance: Normal appearance.   HENT:      Head: Normocephalic and atraumatic.   Cardiovascular:      Rate and Rhythm: Normal rate and regular rhythm.      Pulses: Normal pulses.      Heart sounds: Normal heart sounds. No murmur heard.     Comments: EKG: sinus bradycardia (BPM 59); with  first degree AV block; intraventricular conduction delay.   Pulmonary:      Effort: Pulmonary effort is normal. No respiratory distress.      Breath sounds: Normal breath sounds. No wheezing or rhonchi.   Chest:      Chest wall: No tenderness.   Abdominal:      Tenderness: There is no right CVA tenderness or left CVA tenderness.   Musculoskeletal:      Cervical back: Normal range of motion and neck supple. No tenderness.      Right lower leg: No edema.      Left lower leg: No edema.   Skin:     General: Skin is warm and dry.      Findings: No erythema.   Neurological:      General: No focal deficit present.      Mental Status: He is alert and oriented to person, place, and time.   Psychiatric:         Mood and Affect: Mood normal.           Assessment & Plan   Diagnoses and all orders for this visit:    1. AV block, 1st degree (Primary)  Comments:  EKG in office today continues to show intraventricular conduction delay.   Pt referred to cardiology.    Labs ordered.   Given strict ER instructions.   Orders:  -     Ambulatory Referral to Cardiology  -     Comprehensive metabolic panel; Future  -     TSH; Future  -     CBC w AUTO Differential; Future    2. Heart palpitations  Comments:  EKG in office today continues to show intraventricular conduction delay.   Pt referred to cardiology.    Labs ordered.   Given strict ER instructions.     3. Abnormal EKG  Comments:  EKG in office today continues to show intraventricular conduction delay.   Pt referred to cardiology.    Given strict ER instructions.   Orders:  -     Ambulatory Referral to Cardiology  -     Comprehensive metabolic panel; Future  -     TSH; Future  -     CBC w AUTO Differential; Future

## 2022-05-20 ENCOUNTER — LAB (OUTPATIENT)
Dept: FAMILY MEDICINE CLINIC | Facility: CLINIC | Age: 42
End: 2022-05-20

## 2022-05-20 ENCOUNTER — OFFICE VISIT (OUTPATIENT)
Dept: FAMILY MEDICINE CLINIC | Facility: CLINIC | Age: 42
End: 2022-05-20

## 2022-05-20 VITALS
SYSTOLIC BLOOD PRESSURE: 109 MMHG | HEART RATE: 71 BPM | OXYGEN SATURATION: 100 % | BODY MASS INDEX: 27.3 KG/M2 | WEIGHT: 195 LBS | HEIGHT: 71 IN | DIASTOLIC BLOOD PRESSURE: 73 MMHG

## 2022-05-20 DIAGNOSIS — I44.0 AV BLOCK, 1ST DEGREE: ICD-10-CM

## 2022-05-20 DIAGNOSIS — R94.31 ABNORMAL EKG: ICD-10-CM

## 2022-05-20 DIAGNOSIS — I44.0 AV BLOCK, 1ST DEGREE: Primary | ICD-10-CM

## 2022-05-20 DIAGNOSIS — R00.2 HEART PALPITATIONS: ICD-10-CM

## 2022-05-20 LAB
ALBUMIN SERPL-MCNC: 4.5 G/DL (ref 3.5–5.2)
ALBUMIN/GLOB SERPL: 1.7 G/DL
ALP SERPL-CCNC: 85 U/L (ref 39–117)
ALT SERPL W P-5'-P-CCNC: 18 U/L (ref 1–41)
ANION GAP SERPL CALCULATED.3IONS-SCNC: 10.3 MMOL/L (ref 5–15)
AST SERPL-CCNC: 20 U/L (ref 1–40)
BASOPHILS # BLD AUTO: 0.04 10*3/MM3 (ref 0–0.2)
BASOPHILS NFR BLD AUTO: 0.6 % (ref 0–1.5)
BILIRUB SERPL-MCNC: 0.6 MG/DL (ref 0–1.2)
BUN SERPL-MCNC: 16 MG/DL (ref 6–20)
BUN/CREAT SERPL: 16.3 (ref 7–25)
CALCIUM SPEC-SCNC: 9.2 MG/DL (ref 8.6–10.5)
CHLORIDE SERPL-SCNC: 103 MMOL/L (ref 98–107)
CO2 SERPL-SCNC: 23.7 MMOL/L (ref 22–29)
CREAT SERPL-MCNC: 0.98 MG/DL (ref 0.76–1.27)
DEPRECATED RDW RBC AUTO: 41.6 FL (ref 37–54)
EGFRCR SERPLBLD CKD-EPI 2021: 98.7 ML/MIN/1.73
EOSINOPHIL # BLD AUTO: 0.27 10*3/MM3 (ref 0–0.4)
EOSINOPHIL NFR BLD AUTO: 3.9 % (ref 0.3–6.2)
ERYTHROCYTE [DISTWIDTH] IN BLOOD BY AUTOMATED COUNT: 12.4 % (ref 12.3–15.4)
GLOBULIN UR ELPH-MCNC: 2.6 GM/DL
GLUCOSE SERPL-MCNC: 93 MG/DL (ref 65–99)
HCT VFR BLD AUTO: 44.6 % (ref 37.5–51)
HGB BLD-MCNC: 15 G/DL (ref 13–17.7)
IMM GRANULOCYTES # BLD AUTO: 0.04 10*3/MM3 (ref 0–0.05)
IMM GRANULOCYTES NFR BLD AUTO: 0.6 % (ref 0–0.5)
LYMPHOCYTES # BLD AUTO: 2.46 10*3/MM3 (ref 0.7–3.1)
LYMPHOCYTES NFR BLD AUTO: 35.8 % (ref 19.6–45.3)
MCH RBC QN AUTO: 30.9 PG (ref 26.6–33)
MCHC RBC AUTO-ENTMCNC: 33.6 G/DL (ref 31.5–35.7)
MCV RBC AUTO: 92 FL (ref 79–97)
MONOCYTES # BLD AUTO: 0.79 10*3/MM3 (ref 0.1–0.9)
MONOCYTES NFR BLD AUTO: 11.5 % (ref 5–12)
NEUTROPHILS NFR BLD AUTO: 3.28 10*3/MM3 (ref 1.7–7)
NEUTROPHILS NFR BLD AUTO: 47.6 % (ref 42.7–76)
NRBC BLD AUTO-RTO: 0 /100 WBC (ref 0–0.2)
PLATELET # BLD AUTO: 284 10*3/MM3 (ref 140–450)
PMV BLD AUTO: 10 FL (ref 6–12)
POTASSIUM SERPL-SCNC: 4.2 MMOL/L (ref 3.5–5.2)
PROT SERPL-MCNC: 7.1 G/DL (ref 6–8.5)
RBC # BLD AUTO: 4.85 10*6/MM3 (ref 4.14–5.8)
SODIUM SERPL-SCNC: 137 MMOL/L (ref 136–145)
TSH SERPL DL<=0.05 MIU/L-ACNC: 1.29 UIU/ML (ref 0.27–4.2)
WBC NRBC COR # BLD: 6.88 10*3/MM3 (ref 3.4–10.8)

## 2022-05-20 PROCEDURE — 36415 COLL VENOUS BLD VENIPUNCTURE: CPT

## 2022-05-20 PROCEDURE — 84443 ASSAY THYROID STIM HORMONE: CPT | Performed by: NURSE PRACTITIONER

## 2022-05-20 PROCEDURE — 85025 COMPLETE CBC W/AUTO DIFF WBC: CPT | Performed by: NURSE PRACTITIONER

## 2022-05-20 PROCEDURE — 80053 COMPREHEN METABOLIC PANEL: CPT | Performed by: NURSE PRACTITIONER

## 2022-05-20 PROCEDURE — 99214 OFFICE O/P EST MOD 30 MIN: CPT | Performed by: NURSE PRACTITIONER

## 2022-05-27 ENCOUNTER — OFFICE VISIT (OUTPATIENT)
Dept: CARDIOLOGY | Facility: CLINIC | Age: 42
End: 2022-05-27

## 2022-05-27 VITALS
HEIGHT: 71 IN | BODY MASS INDEX: 27.3 KG/M2 | DIASTOLIC BLOOD PRESSURE: 74 MMHG | WEIGHT: 195 LBS | OXYGEN SATURATION: 98 % | SYSTOLIC BLOOD PRESSURE: 110 MMHG | HEART RATE: 61 BPM

## 2022-05-27 DIAGNOSIS — R94.31 ABNORMAL EKG: ICD-10-CM

## 2022-05-27 DIAGNOSIS — I44.0 FIRST DEGREE HEART BLOCK: ICD-10-CM

## 2022-05-27 DIAGNOSIS — E78.5 DYSLIPIDEMIA: ICD-10-CM

## 2022-05-27 DIAGNOSIS — R42 DIZZINESS: Primary | ICD-10-CM

## 2022-05-27 DIAGNOSIS — R00.1 BRADYCARDIA: ICD-10-CM

## 2022-05-27 PROCEDURE — 99204 OFFICE O/P NEW MOD 45 MIN: CPT | Performed by: INTERNAL MEDICINE

## 2022-05-27 NOTE — PROGRESS NOTES
Cardiology Consult Note    Patient Identification:  Name: Camron Rodriguez  Age: 42 y.o.  Sex: male  :  1980  MRN: [id number]             Requesting Physician :  Natalie Ortiz APRN        Reason for Consultation / Chief Complaint : Dizziness, abnormal EKG    History of Present Illness:      Mr. Camron Rodriguez has PMH of    Hyperlipidemia  HELDER/CPAP  Vasectomy  Family history of MI in maternal grandfather and colon cancer    Here for evaluation of dizziness.  Patient had 3 episodes of dizziness sometimes lasting for a long duration.  Denies any chest pain, loss of consciousness.    Patient's arterial blood pressure is 110/74, heart rate 61, O2 sat of 98% on room air.    Labs from from 2021 reveal lipid profile with cholesterol 191, triglycerides 263, HDL 29, .  Labs from 2022 revealed normal TSH at 1.2, normal CBC and CMP.    Assessment:  :    Dizziness  First-degree AV block  Bradycardia  Abnormal EKG  Dyslipidemia      Recommendations / Plan:        Reviewed EKG results with patient.  We will check a Holter to see if patient is having any culprit arrhythmias causing dizziness.  We will check an echocardiogram to rule out structural heart disease.  Follow-up with PMD for dyslipidemia.  Follow-up with PMD for sleep apnea.  Will follow up and consider further evaluation treatment.           Diagnosis Plan   1. Dizziness  Adult Transthoracic Echo Complete W/ Cont if Necessary Per Protocol    Holter Monitor - 72 Hour Up To 15 Days   2. First degree heart block  Adult Transthoracic Echo Complete W/ Cont if Necessary Per Protocol    Holter Monitor - 72 Hour Up To 15 Days   3. Dyslipidemia  Adult Transthoracic Echo Complete W/ Cont if Necessary Per Protocol    Holter Monitor - 72 Hour Up To 15 Days   4. Abnormal EKG     5. Bradycardia                Past Medical History:  Past Medical History:   Diagnosis Date   • Acute pharyngitis    • Anxiety    • HELDER on CPAP     AHI:16.5   • Stool contents  "finding, abnormal      Past Surgical History:  Past Surgical History:   Procedure Laterality Date   • COLONOSCOPY     • TYMPANOPLASTY     • VASECTOMY        Allergies:  No Known Allergies  Home Meds:  Current Meds:     Current Outpatient Medications:   •  pimecrolimus (ELIDEL) 1 % cream, ELIDEL 1 % CREA, Disp: , Rfl:   •  rosuvastatin (CRESTOR) 10 MG tablet, TAKE 1 TABLET BY MOUTH DAILY, Disp: 90 tablet, Rfl: 1  Social History:   Social History     Tobacco Use   • Smoking status: Never Smoker   • Smokeless tobacco: Never Used   Substance Use Topics   • Alcohol use: Yes     Comment: social drinker       Family History:  Family History   Problem Relation Age of Onset   • Diabetes Maternal Grandmother    • Heart attack Maternal Grandfather         MI X2   • Other Maternal Grandfather         CABG   • Colon cancer Other    • Prostate cancer Other         Review of Systems : Review of Systems   Constitutional: Negative for fever and malaise/fatigue.   HENT: Negative for congestion and hearing loss.    Eyes: Negative for double vision and visual disturbance.   Cardiovascular: Negative for chest pain, claudication, dyspnea on exertion, leg swelling and syncope.   Respiratory: Negative for cough and shortness of breath.    Endocrine: Negative for cold intolerance.   Skin: Negative for color change and rash.   Musculoskeletal: Negative for arthritis and joint pain.   Gastrointestinal: Negative for abdominal pain and heartburn.   Genitourinary: Negative for hematuria.   Neurological: Positive for dizziness. Negative for excessive daytime sleepiness.   Psychiatric/Behavioral: Negative for depression. The patient is not nervous/anxious.    All other systems reviewed and are negative.              Constitutional:       Physical Exam   /74   Pulse 61   Ht 180.3 cm (71\")   Wt 88.5 kg (195 lb)   SpO2 98%   BMI 27.20 kg/m²   Physical Exam  General:  Appears in no acute distress  Eyes: Sclera is anicteric,  conjunctiva is " clear   HEENT:  No JVD. Thyroid not visibly enlarged. No mucosal pallor or cyanosis  Respiratory: Respirations regular and unlabored at rest.  Bilaterally good breath sounds, with good air entry in all fields. No crackles, rubs or wheezes auscultated  Cardiovascular: S1,S2 Regular rate and rhythm. No murmur, rub or gallop auscultated. No pretibial pitting edema  Gastrointestinal: Abdomen soft, flat, non tender. Bowel sounds present.   Musculoskeletal:  No abnormal movements  Extremities: No digital clubbing or cyanosis  Skin: Color pink. Skin warm and dry to touch. No rashes  No xanthoma  Neuro: Alert and awake, no lateralizing deficits appreciated    Cardiographics  ECG: EKG tracing was  personally reviewed/interpreted by me from 5/20/2022 reveals sinus bradycardia at the rate of 59 bpm with first-degree AV block and intraventricular conduction delay and lateral MI        Imaging  Chest X-ray:   Imaging Results (Last 24 Hours)     ** No results found for the last 24 hours. **          Lab Review: I have reviewed the labs              @[email or web address]                  [facility] Atul Perez MD  5/28/2022, 12:37 EDT      EMR Dragon/Transcription:   Dictated utilizing Dragon dictation

## 2022-06-03 ENCOUNTER — HOSPITAL ENCOUNTER (OUTPATIENT)
Dept: CARDIOLOGY | Facility: HOSPITAL | Age: 42
Discharge: HOME | End: 2022-06-03
Admitting: INTERNAL MEDICINE

## 2022-06-03 VITALS
BODY MASS INDEX: 27.3 KG/M2 | DIASTOLIC BLOOD PRESSURE: 80 MMHG | HEIGHT: 71 IN | SYSTOLIC BLOOD PRESSURE: 119 MMHG | WEIGHT: 195 LBS

## 2022-06-03 DIAGNOSIS — R42 DIZZINESS: ICD-10-CM

## 2022-06-03 DIAGNOSIS — I44.0 FIRST DEGREE HEART BLOCK: ICD-10-CM

## 2022-06-03 DIAGNOSIS — E78.5 DYSLIPIDEMIA: ICD-10-CM

## 2022-06-03 PROCEDURE — 93306 TTE W/DOPPLER COMPLETE: CPT | Performed by: INTERNAL MEDICINE

## 2022-06-03 PROCEDURE — 93306 TTE W/DOPPLER COMPLETE: CPT

## 2022-06-07 LAB
BH CV ECHO MEAS - ACS: 2.07 CM
BH CV ECHO MEAS - AI P1/2T: 340.4 MSEC
BH CV ECHO MEAS - AO MAX PG: 4.9 MMHG
BH CV ECHO MEAS - AO MEAN PG: 2.6 MMHG
BH CV ECHO MEAS - AO ROOT DIAM: 3.1 CM
BH CV ECHO MEAS - AO V2 MAX: 110.3 CM/SEC
BH CV ECHO MEAS - AO V2 VTI: 25.5 CM
BH CV ECHO MEAS - AVA(I,D): 2.14 CM2
BH CV ECHO MEAS - EDV(CUBED): 84.5 ML
BH CV ECHO MEAS - EDV(MOD-SP4): 61.1 ML
BH CV ECHO MEAS - EF(MOD-SP4): 59.2 %
BH CV ECHO MEAS - ESV(CUBED): 28 ML
BH CV ECHO MEAS - ESV(MOD-SP4): 24.9 ML
BH CV ECHO MEAS - FS: 30.8 %
BH CV ECHO MEAS - IVS/LVPW: 0.6 CM
BH CV ECHO MEAS - IVSD: 0.67 CM
BH CV ECHO MEAS - LA DIMENSION: 3.4 CM
BH CV ECHO MEAS - LV DIASTOLIC VOL/BSA (35-75): 29.3 CM2
BH CV ECHO MEAS - LV MASS(C)D: 126.4 GRAMS
BH CV ECHO MEAS - LV MAX PG: 3.8 MMHG
BH CV ECHO MEAS - LV MEAN PG: 2.09 MMHG
BH CV ECHO MEAS - LV SYSTOLIC VOL/BSA (12-30): 11.9 CM2
BH CV ECHO MEAS - LV V1 MAX: 97.7 CM/SEC
BH CV ECHO MEAS - LV V1 VTI: 19.8 CM
BH CV ECHO MEAS - LVIDD: 4.4 CM
BH CV ECHO MEAS - LVIDS: 3 CM
BH CV ECHO MEAS - LVOT AREA: 2.7 CM2
BH CV ECHO MEAS - LVOT DIAM: 1.87 CM
BH CV ECHO MEAS - LVPWD: 1.12 CM
BH CV ECHO MEAS - MV A MAX VEL: 58.9 CM/SEC
BH CV ECHO MEAS - MV DEC SLOPE: 598.4 CM/SEC2
BH CV ECHO MEAS - MV DEC TIME: 0.15 MSEC
BH CV ECHO MEAS - MV E MAX VEL: 90.7 CM/SEC
BH CV ECHO MEAS - MV E/A: 1.54
BH CV ECHO MEAS - MV MAX PG: 3.9 MMHG
BH CV ECHO MEAS - MV MEAN PG: 2.13 MMHG
BH CV ECHO MEAS - MV V2 VTI: 27.4 CM
BH CV ECHO MEAS - MVA(VTI): 1.98 CM2
BH CV ECHO MEAS - PA ACC TIME: 0.14 SEC
BH CV ECHO MEAS - PA PR(ACCEL): 16.7 MMHG
BH CV ECHO MEAS - PA V2 MAX: 75.8 CM/SEC
BH CV ECHO MEAS - RAP SYSTOLE: 3 MMHG
BH CV ECHO MEAS - RV MAX PG: 1.14 MMHG
BH CV ECHO MEAS - RV V1 MAX: 53.3 CM/SEC
BH CV ECHO MEAS - RV V1 VTI: 14.9 CM
BH CV ECHO MEAS - RVDD: 3.1 CM
BH CV ECHO MEAS - RVSP: 12.4 MMHG
BH CV ECHO MEAS - SI(MOD-SP4): 17.4 ML/M2
BH CV ECHO MEAS - SV(LVOT): 54.4 ML
BH CV ECHO MEAS - SV(MOD-SP4): 36.2 ML
BH CV ECHO MEAS - TR MAX PG: 9.4 MMHG
BH CV ECHO MEAS - TR MAX VEL: 153.6 CM/SEC
MAXIMAL PREDICTED HEART RATE: 178 BPM
STRESS TARGET HR: 151 BPM

## 2022-06-08 ENCOUNTER — PATIENT MESSAGE (OUTPATIENT)
Dept: CARDIOLOGY | Facility: CLINIC | Age: 42
End: 2022-06-08

## 2022-06-08 ENCOUNTER — TELEPHONE (OUTPATIENT)
Dept: CARDIOLOGY | Facility: CLINIC | Age: 42
End: 2022-06-08

## 2022-06-08 DIAGNOSIS — I44.7 LBBB (LEFT BUNDLE BRANCH BLOCK): ICD-10-CM

## 2022-06-08 DIAGNOSIS — I47.20 V-TACH: Primary | ICD-10-CM

## 2022-06-08 RX ORDER — METOPROLOL SUCCINATE 25 MG/1
12.5 TABLET, EXTENDED RELEASE ORAL DAILY
Qty: 15 TABLET | Refills: 3 | Status: SHIPPED | OUTPATIENT
Start: 2022-06-08 | End: 2023-03-29

## 2022-06-08 NOTE — PROGRESS NOTES
Per Dr. Perez, patient need coronary CTA and placed on beta blocker due to LBBB vs non sustained Vtach.     Orders placed  Electronically signed by MARIELLE Mccabe, 06/08/22, 11:16 AM EDT.

## 2022-06-08 NOTE — TELEPHONE ENCOUNTER
Please let patient know Dr. Perez would like a coronary CTA to assess his coronary arteries.  This will need to be scheduled at [facility].      He also wants to start low dose beta blocker to help his symptoms.  Monitor blood pressure and heart rate at home.

## 2022-06-21 ENCOUNTER — TELEPHONE (OUTPATIENT)
Dept: CARDIOLOGY | Facility: CLINIC | Age: 42
End: 2022-06-21

## 2022-06-21 NOTE — TELEPHONE ENCOUNTER
He should be taking it daily for now.  [facility], [place], operated by [facility] should give him the instructions.

## 2022-06-21 NOTE — TELEPHONE ENCOUNTER
Yanira,    This patient is scheduled for a CTA on 07/21/22 and is prescribed metoprolol and needs instructions.    Please advise to patient.    Thank you,    Deneen

## 2022-07-10 NOTE — PROGRESS NOTES
07/21/22 0001   Pre-Procedure Phone Call   Procedure Time Verified Yes   Arrival Time 1000   Procedure Location Verified Yes   Medical History Reviewed No   NPO Status Reinforced Yes   Ride and Caregiver Arranged N/A   Patient Knows to Bring Current Medications No   Bring Outside Films Requested No

## 2022-07-21 ENCOUNTER — HOSPITAL ENCOUNTER (OUTPATIENT)
Dept: CT IMAGING | Facility: HOSPITAL | Age: 42
Discharge: HOME | End: 2022-07-21
Admitting: NURSE PRACTITIONER

## 2022-07-21 VITALS
WEIGHT: 192 LBS | SYSTOLIC BLOOD PRESSURE: 106 MMHG | OXYGEN SATURATION: 98 % | DIASTOLIC BLOOD PRESSURE: 74 MMHG | BODY MASS INDEX: 26.88 KG/M2 | HEIGHT: 71 IN | HEART RATE: 58 BPM | RESPIRATION RATE: 16 BRPM | TEMPERATURE: 98.4 F

## 2022-07-21 DIAGNOSIS — I44.7 LBBB (LEFT BUNDLE BRANCH BLOCK): ICD-10-CM

## 2022-07-21 DIAGNOSIS — I47.20 V-TACH: ICD-10-CM

## 2022-07-21 LAB — QT INTERVAL: 436 MS

## 2022-07-21 PROCEDURE — 75574 CT ANGIO HRT W/3D IMAGE: CPT

## 2022-07-21 PROCEDURE — 75574 CT ANGIO HRT W/3D IMAGE: CPT | Performed by: INTERNAL MEDICINE

## 2022-07-21 PROCEDURE — 93005 ELECTROCARDIOGRAM TRACING: CPT | Performed by: NURSE PRACTITIONER

## 2022-07-21 PROCEDURE — 0 IOPAMIDOL PER 1 ML: Performed by: NURSE PRACTITIONER

## 2022-07-21 RX ADMIN — IOPAMIDOL 100 ML: 755 INJECTION, SOLUTION INTRAVENOUS at 11:11

## 2022-07-21 NOTE — NURSING NOTE
Patient arrived in Radiology Triage [place] [facility] for CTA.    Protective goggles and mask in place with all patient interactions today.

## 2022-07-21 NOTE — NURSING NOTE
Patient ambulated self to Entrance A for discharge.\    Protective goggles and mask in place with all patient interactions today.

## 2022-07-21 NOTE — NURSING NOTE
This RN spoke with Heather GALAN with [facility] regarding patient's EKG of 55 for CTA, ordered to proceed with exam.

## 2022-07-22 ENCOUNTER — DOCUMENTATION (OUTPATIENT)
Dept: CARDIOLOGY | Facility: CLINIC | Age: 42
End: 2022-07-22

## 2022-07-22 ENCOUNTER — TELEPHONE (OUTPATIENT)
Dept: CARDIOLOGY | Facility: CLINIC | Age: 42
End: 2022-07-22

## 2022-07-22 DIAGNOSIS — I44.7 LBBB (LEFT BUNDLE BRANCH BLOCK): ICD-10-CM

## 2022-07-22 DIAGNOSIS — E78.5 DYSLIPIDEMIA: Primary | ICD-10-CM

## 2022-07-22 NOTE — PROGRESS NOTES
Cardiac CTA with morphology  Imaging done 7/21/22  Reason for the exam: LBBB    Calcium score is 0 Agatston units.  This is between the 1-25 percentile for men between the ages of 40-44 years old.    Heart rate 49 bpm.  Left ventricular end-diastolic volume 127 mL.  Left ventricular end-systolic volume 57 mL.  Ejection fraction 55%.  Stroke volume 70 mL.  Cardiac output 3447 mL/m    The right atrium is normal in size.  The right ventricle is normal in size.  There is grossly normal right ventricular systolic function.  The pulmonary artery is normal in size.  There are 4 pulmonary veins which enter the left atrium in their expected location.  The left atrial appendage was visualized and is without thrombus.  The intra-atrial septum appeared to be intact.  The left ventricle is normal in size with normal systolic function.  There was no evidence of a left ventricular thrombus.  The intraventricular septum appeared to be intact.  The mitral valve appeared structurally normal.  The aortic valve was trileaflet and appears structurally and functionally normal.  The pulmonic valve appeared structurally normal.  The tricuspid valve appeared structurally normal.  There was no pericardial effusion.  The pericardium appeared normal.    The left main coronary artery came off the left coronary cusp in its anticipated location.  It bifurcated into the left anterior descending artery and the circumflex coronary artery.  There was no evidence of atherosclerotic disease of the left main coronary artery.  Left anterior descending artery wraps around the apex of the heart.  There is no evidence of atherosclerotic disease.  The circumflex artery is the codominant vessel with no evidence of atherosclerotic disease, OM1 is large and patent.  The right coronary artery is a codominant vessel with no evidence of atherosclerotic disease.    Conclusions:  1.  Normal coronary artery anatomy without evidence of atherosclerotic disease.   Calcium score is 0 Agatston units.  2.  Structurally normal heart.      Jackie Gomez MD  07/22/22

## 2022-07-22 NOTE — TELEPHONE ENCOUNTER
CALLED PT AND [facility] AND HE WANTED TO KNOW IF HE NEEDS TO CONTINUE THE METOPROLOL AND WHEN DOES HE NEED A F/U APPT.

## 2022-07-22 NOTE — TELEPHONE ENCOUNTER
----- Message from MARIELLE Mccabe sent at 7/22/2022 12:02 PM EDT -----  Please let patient know his CT was normal.  No signs of blockages

## 2022-07-25 NOTE — TELEPHONE ENCOUNTER
Called pt and [facility] per reece 6mo f/u. Called and scheduled pt 2/27 and [facility] labwork needed prior to appt. Placed lab orders, pt verbally understood to get labs prior to appt.

## 2023-03-29 ENCOUNTER — TELEPHONE (OUTPATIENT)
Dept: FAMILY MEDICINE CLINIC | Facility: CLINIC | Age: 43
End: 2023-03-29

## 2023-03-29 ENCOUNTER — OFFICE VISIT (OUTPATIENT)
Dept: CARDIOLOGY | Facility: CLINIC | Age: 43
End: 2023-03-29
Payer: COMMERCIAL

## 2023-03-29 VITALS
DIASTOLIC BLOOD PRESSURE: 74 MMHG | SYSTOLIC BLOOD PRESSURE: 110 MMHG | HEART RATE: 67 BPM | HEIGHT: 71 IN | WEIGHT: 206 LBS | OXYGEN SATURATION: 97 % | BODY MASS INDEX: 28.84 KG/M2

## 2023-03-29 DIAGNOSIS — I49.5 SINUS NODE DYSFUNCTION: ICD-10-CM

## 2023-03-29 DIAGNOSIS — R94.31 ABNORMAL EKG: ICD-10-CM

## 2023-03-29 DIAGNOSIS — R00.1 BRADYCARDIA: ICD-10-CM

## 2023-03-29 DIAGNOSIS — E78.5 DYSLIPIDEMIA: ICD-10-CM

## 2023-03-29 DIAGNOSIS — I44.7 LBBB (LEFT BUNDLE BRANCH BLOCK): Primary | ICD-10-CM

## 2023-03-29 PROCEDURE — 99214 OFFICE O/P EST MOD 30 MIN: CPT | Performed by: INTERNAL MEDICINE

## 2023-03-29 PROCEDURE — 93000 ELECTROCARDIOGRAM COMPLETE: CPT | Performed by: INTERNAL MEDICINE

## 2023-03-29 NOTE — TELEPHONE ENCOUNTER
PATIENT SCHEDULED A PHYSICAL WITH PATT ADAME ON 03.31.23 FOR A PHYSICAL DUE TO NO AVAILABILITY WITH DR. RANDA ISRAEL.

## 2023-03-29 NOTE — PROGRESS NOTES
Subjective:     Encounter Date:03/29/2023      Patient ID: Camron Rodriguez is a 43 y.o. male.    Chief Complaint and history of present illness:     Dizziness, abnormal EKG, left bundle branch block.    History of Present Illness:      Mr. Camron Rodriguez has PMH of    Hyperlipidemia  Intermittent LBBB  HELDER/CPAP  Vasectomy  Family history of MI in maternal grandfather and colon cancer    Here for follow-up.  Patient was complaining of dizziness stopped taking his metoprolol.  Has sinus node dysfunction with bradycardia ectopic atrial rhythm and left bundle branch block.    Patient's arterial blood pressure is 110/74, heart rate 61, O2 sat of 98% on room air.    Coronary CTA 7/22/2022 calcium score of 0.  Echocardiogram 6/7/2022 revealed normal LV systolic function with paradoxical septal motion from left bundle branch block, mild MR  Holter monitor 6/7/2022 revealed heart rates from  bpm, mean of 79 bpm.  Had an episode of tachycardia at 138 bpm with left bundle branch block.    Labs from from 12/6/2021 reveal lipid profile with cholesterol 191, triglycerides 263, HDL 29, .  Labs from 5/20/2022 revealed normal TSH at 1.2, normal CBC and CMP.    Assessment:  :    Dizziness  First-degree AV block  Sinus node dysfunction  Left bundle branch block  Bradycardia  Abnormal EKG  Dyslipidemia      Recommendations / Plan:        Reviewed EKG results with patient.  Patient has sinus node dysfunction and left bundle branch block was having dizziness stopped his metoprolol is not having dizziness any further.  We will continue to follow closely and consider permanent pacemaker.  Follow-up with PMD for labs and dyslipidemia..            ECG 12 Lead    Date/Time: 3/29/2023 12:58 PM  Performed by: Adeel Perez MD  Authorized by: Adeel Perez MD   Comparison: compared with previous ECG from 7/21/2022  Comparison to previous ECG: EKG done today reviewed/interpreted by me reveals ectopic atrial  rhythm with left bundle branch block at the rate of 62 bpm, compared to EKG from 7/21/2022 patient has left bundle branch block and sinus node dysfunction              Copied text in this portion of the note has been reviewed and is accurate as of 4/1/2023  The following portions of the patient's history were reviewed and updated as appropriate: allergies, current medications, past family history, past medical history, past social history, past surgical history and problem list.    Assessment:         [facility]     Diagnosis Plan   1. LBBB (left bundle branch block)        2. Sinus node dysfunction        3. Dyslipidemia        4. Abnormal EKG        5. Bradycardia               Plan:               Past Medical History:  Past Medical History:   Diagnosis Date   • Acute pharyngitis    • Anxiety    • HELDER on CPAP     AHI:16.5   • Stool contents finding, abnormal      Past Surgical History:  Past Surgical History:   Procedure Laterality Date   • COLONOSCOPY     • TYMPANOPLASTY     • VASECTOMY        Allergies:  No Known Allergies  Home Meds:  Current Meds:     Current Outpatient Medications:   •  LUTEIN PO, Take  by mouth., Disp: , Rfl:   •  multivitamin (MULTIVITAMIN PO), Take  by mouth Daily., Disp: , Rfl:   •  rosuvastatin (CRESTOR) 10 MG tablet, TAKE 1 TABLET BY MOUTH DAILY (Patient not taking: Reported on 3/29/2023), Disp: 90 tablet, Rfl: 1  Social History:   Social History     Tobacco Use   • Smoking status: Never   • Smokeless tobacco: Never   Substance Use Topics   • Alcohol use: Yes     Comment: social drinker       Family History:  Family History   Problem Relation Age of Onset   • Diabetes Maternal Grandmother    • Heart attack Maternal Grandfather         MI X2   • Other Maternal Grandfather         CABG   • Colon cancer Other    • Prostate cancer Other               Review of Systems   Cardiovascular: Negative for chest pain, leg swelling and palpitations.   Respiratory: Negative for shortness of breath.   "  Neurological: Negative for dizziness and numbness.     All other systems are negative         Objective:     Physical Exam  /74 (BP Location: Left arm, Patient Position: Sitting, Cuff Size: Adult)   Pulse 67   Ht 180.3 cm (71\")   Wt 93.4 kg (206 lb)   SpO2 97%   BMI 28.73 kg/m²   General:  Appears in no acute distress  Eyes: Sclera is anicteric,  conjunctiva is clear   HEENT:  No JVD.  No carotid bruits  Respiratory: Respirations regular and unlabored at rest.  Clear to auscultation  Cardiovascular: S1,S2 Regular rate and rhythm. No murmur, rub or gallop auscultated.   Extremities: No digital clubbing or cyanosis, no edema  Skin: Color pink. Skin warm and dry to touch. No rashes  No xanthoma  Neuro: Alert and awake.    Lab Reviewed:         Adeel Perez MD  4/1/2023 13:01 EDT      EMR Dragon/Transcription:   \"Dictated utilizing Dragon dictation\".        "

## 2023-03-31 ENCOUNTER — OFFICE VISIT (OUTPATIENT)
Dept: FAMILY MEDICINE CLINIC | Facility: CLINIC | Age: 43
End: 2023-03-31
Payer: COMMERCIAL

## 2023-03-31 ENCOUNTER — LAB (OUTPATIENT)
Dept: FAMILY MEDICINE CLINIC | Facility: CLINIC | Age: 43
End: 2023-03-31
Payer: COMMERCIAL

## 2023-03-31 VITALS
BODY MASS INDEX: 29.12 KG/M2 | SYSTOLIC BLOOD PRESSURE: 124 MMHG | OXYGEN SATURATION: 97 % | TEMPERATURE: 96.9 F | WEIGHT: 208 LBS | HEIGHT: 71 IN | DIASTOLIC BLOOD PRESSURE: 83 MMHG | HEART RATE: 69 BPM

## 2023-03-31 DIAGNOSIS — Z00.00 HEALTH MAINTENANCE EXAMINATION: ICD-10-CM

## 2023-03-31 DIAGNOSIS — E78.5 DYSLIPIDEMIA: ICD-10-CM

## 2023-03-31 DIAGNOSIS — Z00.00 HEALTH MAINTENANCE EXAMINATION: Primary | ICD-10-CM

## 2023-03-31 DIAGNOSIS — M25.562 ACUTE PAIN OF LEFT KNEE: ICD-10-CM

## 2023-03-31 DIAGNOSIS — E78.2 MIXED HYPERLIPIDEMIA: ICD-10-CM

## 2023-03-31 LAB
ALBUMIN SERPL-MCNC: 4.3 G/DL (ref 3.5–5.2)
ALBUMIN/GLOB SERPL: 1.3 G/DL
ALP SERPL-CCNC: 100 U/L (ref 39–117)
ALT SERPL W P-5'-P-CCNC: 30 U/L (ref 1–41)
ANION GAP SERPL CALCULATED.3IONS-SCNC: 8.6 MMOL/L (ref 5–15)
AST SERPL-CCNC: 18 U/L (ref 1–40)
BASOPHILS # BLD AUTO: 0.04 10*3/MM3 (ref 0–0.2)
BASOPHILS NFR BLD AUTO: 0.7 % (ref 0–1.5)
BILIRUB SERPL-MCNC: 0.5 MG/DL (ref 0–1.2)
BUN SERPL-MCNC: 18 MG/DL (ref 6–20)
BUN/CREAT SERPL: 16.8 (ref 7–25)
CALCIUM SPEC-SCNC: 9.8 MG/DL (ref 8.6–10.5)
CHLORIDE SERPL-SCNC: 107 MMOL/L (ref 98–107)
CHOLEST SERPL-MCNC: 181 MG/DL (ref 0–200)
CO2 SERPL-SCNC: 26.4 MMOL/L (ref 22–29)
CREAT SERPL-MCNC: 1.07 MG/DL (ref 0.76–1.27)
DEPRECATED RDW RBC AUTO: 41.7 FL (ref 37–54)
EGFRCR SERPLBLD CKD-EPI 2021: 88.3 ML/MIN/1.73
EOSINOPHIL # BLD AUTO: 0.17 10*3/MM3 (ref 0–0.4)
EOSINOPHIL NFR BLD AUTO: 2.8 % (ref 0.3–6.2)
ERYTHROCYTE [DISTWIDTH] IN BLOOD BY AUTOMATED COUNT: 12.2 % (ref 12.3–15.4)
GLOBULIN UR ELPH-MCNC: 3.2 GM/DL
GLUCOSE SERPL-MCNC: 102 MG/DL (ref 65–99)
HCT VFR BLD AUTO: 45.5 % (ref 37.5–51)
HDLC SERPL-MCNC: 33 MG/DL (ref 40–60)
HGB BLD-MCNC: 15.7 G/DL (ref 13–17.7)
IMM GRANULOCYTES # BLD AUTO: 0.05 10*3/MM3 (ref 0–0.05)
IMM GRANULOCYTES NFR BLD AUTO: 0.8 % (ref 0–0.5)
LDLC SERPL CALC-MCNC: 114 MG/DL (ref 0–100)
LDLC/HDLC SERPL: 3.33 {RATIO}
LYMPHOCYTES # BLD AUTO: 2.24 10*3/MM3 (ref 0.7–3.1)
LYMPHOCYTES NFR BLD AUTO: 37.2 % (ref 19.6–45.3)
MCH RBC QN AUTO: 31.9 PG (ref 26.6–33)
MCHC RBC AUTO-ENTMCNC: 34.5 G/DL (ref 31.5–35.7)
MCV RBC AUTO: 92.5 FL (ref 79–97)
MONOCYTES # BLD AUTO: 0.71 10*3/MM3 (ref 0.1–0.9)
MONOCYTES NFR BLD AUTO: 11.8 % (ref 5–12)
NEUTROPHILS NFR BLD AUTO: 2.81 10*3/MM3 (ref 1.7–7)
NEUTROPHILS NFR BLD AUTO: 46.7 % (ref 42.7–76)
NRBC BLD AUTO-RTO: 0 /100 WBC (ref 0–0.2)
PLATELET # BLD AUTO: 308 10*3/MM3 (ref 140–450)
PMV BLD AUTO: 9.8 FL (ref 6–12)
POTASSIUM SERPL-SCNC: 4.6 MMOL/L (ref 3.5–5.2)
PROT SERPL-MCNC: 7.5 G/DL (ref 6–8.5)
RBC # BLD AUTO: 4.92 10*6/MM3 (ref 4.14–5.8)
SODIUM SERPL-SCNC: 142 MMOL/L (ref 136–145)
TRIGL SERPL-MCNC: 191 MG/DL (ref 0–150)
VLDLC SERPL-MCNC: 34 MG/DL (ref 5–40)
WBC NRBC COR # BLD: 6.02 10*3/MM3 (ref 3.4–10.8)

## 2023-03-31 PROCEDURE — 80061 LIPID PANEL: CPT | Performed by: NURSE PRACTITIONER

## 2023-03-31 PROCEDURE — 85025 COMPLETE CBC W/AUTO DIFF WBC: CPT

## 2023-03-31 PROCEDURE — 80053 COMPREHEN METABOLIC PANEL: CPT | Performed by: NURSE PRACTITIONER

## 2023-03-31 PROCEDURE — 36415 COLL VENOUS BLD VENIPUNCTURE: CPT

## 2023-03-31 RX ORDER — DIPHENOXYLATE HYDROCHLORIDE AND ATROPINE SULFATE 2.5; .025 MG/1; MG/1
TABLET ORAL DAILY
COMMUNITY

## 2023-03-31 NOTE — PROGRESS NOTES
"Chief Complaint  Annual Exam (No concerns/Stopped cholesterol med)    Subjective        Camron Rodriguez presents to [facility]  History of Present Illness    Pt is here today for his annual physical. He is feeling well overall. He is a  for a [employer]. This is a new job, he likes it a lot better than his last job. He stopped crestor a year ago. He says he stopped it not because of side effects but out of laziness. He just had a checkup with cardiology for his heart, will follow up in 1 year with Dr. Perez. His diet is good. He tries to watch calorie intake. He plays sports with his kids for exercise, and walks a lot. He drinks alcohol occasionally.     In February he was playing basketball with his son and woke up the next morning and the inner left knee was hurting. He says it is better, but still bothers him occasionally. He says he wants to do physical therapy for this. He knows a physical therapist and is going to talk to him about this.    He has a family history of colon cancer and is UTD on his coloscopy. Bowel habits haven't changed.     Objective   Vital Signs:  /83 (BP Location: Left arm, Patient Position: Sitting, Cuff Size: Adult)   Pulse 69   Temp 96.9 °F (36.1 °C) (Temporal)   Ht 180.3 cm (71\")   Wt 94.3 kg (208 lb)   SpO2 97%   BMI 29.01 kg/m²   Estimated body mass index is 29.01 kg/m² as calculated from the following:    Height as of this encounter: 180.3 cm (71\").    Weight as of this encounter: 94.3 kg (208 lb).          Review of Systems   Constitutional: Negative for appetite change, chills, fatigue and fever.   HENT: Negative for congestion, hearing loss and sore throat.    Eyes: Negative for pain, itching and visual disturbance.   Respiratory: Negative for cough, shortness of breath and wheezing.    Cardiovascular: Negative for chest pain, palpitations and leg swelling.   Gastrointestinal: Negative for abdominal pain, " nausea and vomiting.   Endocrine: Negative for polydipsia, polyphagia and polyuria.   Genitourinary: Negative for dysuria, flank pain and hematuria.   Musculoskeletal: Positive for arthralgias (left inner knee). Negative for gait problem, joint swelling and myalgias.   Skin: Negative for rash and wound.   Neurological: Negative for dizziness, syncope, weakness, light-headedness and confusion.   Hematological: Negative for adenopathy.   Psychiatric/Behavioral: Negative for agitation, behavioral problems and decreased concentration. The patient is not nervous/anxious.         Physical Exam  Vitals reviewed.   Constitutional:       General: He is not in acute distress.     Appearance: Normal appearance. He is not ill-appearing, toxic-appearing or diaphoretic.   HENT:      Head: Normocephalic and atraumatic.   Eyes:      Conjunctiva/sclera: Conjunctivae normal.      Pupils: Pupils are equal, round, and reactive to light.   Cardiovascular:      Rate and Rhythm: Normal rate and regular rhythm.      Pulses: Normal pulses.      Heart sounds: Normal heart sounds.   Pulmonary:      Effort: Pulmonary effort is normal. No respiratory distress.      Breath sounds: Normal breath sounds.   Abdominal:      General: Bowel sounds are normal.      Palpations: Abdomen is soft.      Tenderness: There is no abdominal tenderness.   Musculoskeletal:         General: Tenderness (left inner knee) present. No swelling.      Cervical back: Normal range of motion and neck supple.      Right knee: Normal.      Left knee: Decreased range of motion. Tenderness present over the medial joint line.   Lymphadenopathy:      Cervical: No cervical adenopathy.   Skin:     General: Skin is warm and dry.      Capillary Refill: Capillary refill takes less than 2 seconds.   Neurological:      General: No focal deficit present.      Mental Status: He is alert and oriented to person, place, and time.   Psychiatric:         Mood and Affect: Mood normal.          Behavior: Behavior normal.         Thought Content: Thought content normal.         Judgment: Judgment normal.        Result Review :                Assessment and Plan   Diagnoses and all orders for this visit:    1. Health maintenance examination (Primary)  Comments:  -follow up with physical in 1 year  -labs  Orders:  -     Comprehensive metabolic panel; Future  -     CBC w AUTO Differential; Future    2. Mixed hyperlipidemia  Comments:  -lipid panel  -may need to go back on crestor    3. Acute pain of left knee  Comments:  -try PT  -if this persists, let us know and we can order imaging or send to ortho.              Follow Up   Return in about 1 year (around 3/31/2024).  Patient was given instructions and counseling regarding his condition or for health maintenance advice. Please see specific information pulled into the AVS if appropriate.

## 2023-04-02 DIAGNOSIS — E78.2 MIXED HYPERLIPIDEMIA: Primary | ICD-10-CM

## 2023-04-02 RX ORDER — ROSUVASTATIN CALCIUM 10 MG/1
10 TABLET, COATED ORAL DAILY
Qty: 90 TABLET | Refills: 0 | Status: SHIPPED | OUTPATIENT
Start: 2023-04-02

## 2023-04-03 ENCOUNTER — TELEPHONE (OUTPATIENT)
Dept: CARDIOLOGY | Facility: CLINIC | Age: 43
End: 2023-04-03
Payer: COMMERCIAL

## 2023-04-03 NOTE — TELEPHONE ENCOUNTER
"Please let patient know his \"bad cholesterol\" LDL is still too high.  Increase his crestor to 20mg daily and repeat lipid panel in 6 months with PCP   "

## 2024-04-07 NOTE — PROGRESS NOTES
Subjective:     Encounter Date:04/10/2024      Patient ID: Camron Rodriguez is a 44 y.o. male.    Chief Complaint and history of present illness:     SSS, abnormal EKG, left bundle branch block, Lyle, HELDER/CPAP.     History of Present Illness:       Mr. Camron Rodriguez has PMH of     Hyperlipidemia  Sinus node dysfunction,LBBB  HELDER/CPAP  Vasecto bradycardia, ectopic atrial rhythm, my  Family history of MI in maternal grandfather (age unknown) and colon cancer     Here for follow-up.  Patient previously was dizzy when he was taking metoprolol and EKG was showing ectopic atrial rhythm sinus node dysfunction and left bundle branch block.  Patient stopped beta-blockers and was feeling better.  Now with is here for follow-up.  Denies any chest pain shortness of breath dizziness loss of consciousness.  He is fairly active plays [employer] without symptoms.     Patient's arterial blood pressure is 113/82, heart rate 70, O2 sat of 97 % on room air.     Coronary CTA 7/22/2022 calcium score of 0.  Echocardiogram 6/7/2022 revealed normal LV systolic function with paradoxical septal motion from left bundle branch block, mild MR  Holter monitor 6/7/2022 revealed heart rates from  bpm, mean of 79 bpm.  Had an episode of tachycardia at 138 bpm with left bundle branch block.     Labs from from 12/6/2021 reveal lipid profile with cholesterol 191, triglycerides 263, HDL 29, .  Labs from 5/20/2022 revealed normal TSH at 1.2, normal CBC and CMP.  Labs from 3/31/2024 revealed CMP with a glucose of 102.  Lipid profile with cholesterol 181, triglycerides 191, HDL 33,      Assessment:  :     Sinus node dysfunction  Left bundle branch block  Bradycardia, ectopic atrial rhythm  Dyslipidemia  HELDER/CPAP        Recommendations / Plan:         Reviewed EKG results with patient.  Patient has sinus node dysfunction and left bundle branch block with patient.  Patient is asymptomatic we will continue to monitor.  We will  continue to follow closely and consider permanent pacemaker.  Reviewed lipid profile with patient.  HDL is low counseled on walking exercise for that.  And increase Crestor to 20 mg.  Will check labs before next visit..                 ECG 12 Lead     Date/Time: 3/29/2023 12:58 PM  Performed by: Adeel Perez MD  Authorized by: Adeel Perez MD   Comparison: compared with previous ECG from 7/21/2022  Comparison to previous ECG: EKG done today reviewed/interpreted by me reveals ectopic atrial rhythm with left bundle branch block at the rate of 62 bpm, compared to EKG from 7/21/2022 patient has left bundle branch block and sinus node dysfunction                 ECG 12 Lead    Date/Time: 4/10/2024 10:22 AM  Performed by: Adeel Perez MD    Authorized by: Adeel Perez MD  Comparison: compared with previous ECG from 3/29/2023  Comparison to previous ECG: EKG done today reviewed/interpreted by me reveals ectopic atrial rhythm at the rate of 65 bpm with left bundle branch block and QRS duration of 459 ms.  No significant change compared EKG from 3/29/2023.          Copied text in this portion of the note has been reviewed and is accurate as of 4/10/2024  The following portions of the patient's history were reviewed and updated as appropriate: allergies, current medications, past family history, past medical history, past social history, past surgical history and problem list.    Assessment:         MDM       Diagnosis Plan   1. Sinus node dysfunction  Comprehensive Metabolic Panel    Lipid Panel      2. Mixed hyperlipidemia  rosuvastatin (CRESTOR) 20 MG tablet    Comprehensive Metabolic Panel    Lipid Panel      3. SSS (sick sinus syndrome)  Comprehensive Metabolic Panel    Lipid Panel      4. LBBB (left bundle branch block)  Comprehensive Metabolic Panel    Lipid Panel      5. Dyslipidemia  Comprehensive Metabolic Panel    Lipid Panel      6. Bradycardia  Comprehensive  "Metabolic Panel    Lipid Panel      7. HELDER on CPAP               Plan:               Past Medical History:  Past Medical History:   Diagnosis Date    Acute pharyngitis     Anxiety     Macular degeneration     HELDER on CPAP     AHI:16.5    Stool contents finding, abnormal      Past Surgical History:  Past Surgical History:   Procedure Laterality Date    COLONOSCOPY      TYMPANOPLASTY      VASECTOMY        Allergies:  No Known Allergies  Home Meds:  Current Meds:     Current Outpatient Medications:     LUTEIN PO, Take  by mouth., Disp: , Rfl:     multivitamin (MULTIVITAMIN PO), Take  by mouth Daily., Disp: , Rfl:     rosuvastatin (CRESTOR) 20 MG tablet, Take 1 tablet by mouth Daily., Disp: 90 tablet, Rfl: 3  Social History:   Social History     Tobacco Use    Smoking status: Never     Passive exposure: Never    Smokeless tobacco: Never   Substance Use Topics    Alcohol use: Yes     Comment: social drinker       Family History:  Family History   Problem Relation Age of Onset    Diabetes Maternal Grandmother     Heart attack Maternal Grandfather         MI X2    Other Maternal Grandfather         CABG    Colon cancer Other     Prostate cancer Other               ROS  All other systems are negative         Objective:     Physical Exam  /82   Pulse 70   Wt 90.7 kg (200 lb)   SpO2 97%   BMI 27.89 kg/m²   General:  Appears in no acute distress  Eyes: Sclera is anicteric,  conjunctiva is clear   HEENT:  No JVD.  No carotid bruits  Respiratory: Respirations regular and unlabored at rest.  Clear to auscultation  Cardiovascular: S1,S2 Regular rate and rhythm. .   Extremities: No digital clubbing or cyanosis, no edema  Skin: Color pink. Skin warm and dry to touch. No rashes  No xanthoma  Neuro: Alert and awake.    Lab Reviewed:         Adeel Perez MD  4/10/2024 10:23 EDT      EMR Dragon/Transcription:   \"Dictated utilizing Dragon dictation\".        "

## 2024-04-10 ENCOUNTER — OFFICE VISIT (OUTPATIENT)
Dept: CARDIOLOGY | Facility: CLINIC | Age: 44
End: 2024-04-10
Payer: COMMERCIAL

## 2024-04-10 VITALS
DIASTOLIC BLOOD PRESSURE: 82 MMHG | WEIGHT: 200 LBS | SYSTOLIC BLOOD PRESSURE: 113 MMHG | OXYGEN SATURATION: 97 % | HEART RATE: 70 BPM | BODY MASS INDEX: 27.89 KG/M2

## 2024-04-10 DIAGNOSIS — E78.5 DYSLIPIDEMIA: ICD-10-CM

## 2024-04-10 DIAGNOSIS — R00.1 BRADYCARDIA: ICD-10-CM

## 2024-04-10 DIAGNOSIS — G47.33 OSA ON CPAP: ICD-10-CM

## 2024-04-10 DIAGNOSIS — I44.7 LBBB (LEFT BUNDLE BRANCH BLOCK): ICD-10-CM

## 2024-04-10 DIAGNOSIS — E78.2 MIXED HYPERLIPIDEMIA: ICD-10-CM

## 2024-04-10 DIAGNOSIS — I49.5 SINUS NODE DYSFUNCTION: Primary | ICD-10-CM

## 2024-04-10 DIAGNOSIS — I49.5 SSS (SICK SINUS SYNDROME): ICD-10-CM

## 2024-04-10 RX ORDER — ROSUVASTATIN CALCIUM 20 MG/1
20 TABLET, COATED ORAL DAILY
Qty: 90 TABLET | Refills: 3 | Status: SHIPPED | OUTPATIENT
Start: 2024-04-10

## 2024-04-22 ENCOUNTER — OFFICE VISIT (OUTPATIENT)
Dept: FAMILY MEDICINE CLINIC | Facility: CLINIC | Age: 44
End: 2024-04-22
Payer: COMMERCIAL

## 2024-04-22 VITALS
BODY MASS INDEX: 28.25 KG/M2 | WEIGHT: 201.8 LBS | TEMPERATURE: 97.1 F | DIASTOLIC BLOOD PRESSURE: 77 MMHG | SYSTOLIC BLOOD PRESSURE: 105 MMHG | HEIGHT: 71 IN | OXYGEN SATURATION: 96 % | HEART RATE: 75 BPM

## 2024-04-22 DIAGNOSIS — E78.5 DYSLIPIDEMIA: ICD-10-CM

## 2024-04-22 DIAGNOSIS — Z00.00 HEALTH MAINTENANCE EXAMINATION: Primary | ICD-10-CM

## 2024-04-22 PROCEDURE — 99396 PREV VISIT EST AGE 40-64: CPT

## 2024-04-22 NOTE — PROGRESS NOTES
"Chief Complaint  Annual Exam    Subjective        Camron Rodriguez presents to [facility]  History of Present Illness    Pt presents to the office today for his annual physical. He has been busy with pickleball 4-5 times per week. He is feeling good overall. He just followed up with Dr. Perez for his sick sinus syndrome. Everything looked great. He doesn't need a pacemaker yet.     His diet is okay. He thinks he could improve on this. He eats out 3 days oer week. He eats frozen dinners a lot. He drinks water most of the time.    Objective   Vital Signs:  /77 (BP Location: Left arm, Patient Position: Sitting, Cuff Size: Adult)   Pulse 75   Temp 97.1 °F (36.2 °C) (Temporal)   Ht 180.3 cm (71\")   Wt 91.5 kg (201 lb 12.8 oz)   SpO2 96%   BMI 28.15 kg/m²   Estimated body mass index is 28.15 kg/m² as calculated from the following:    Height as of this encounter: 180.3 cm (71\").    Weight as of this encounter: 91.5 kg (201 lb 12.8 oz).    BMI is >= 25 and <30. (Overweight) The following options were offered after discussion;: weight loss educational material (shared in after visit summary), exercise counseling/recommendations, and nutrition counseling/recommendations      Review of Systems   Constitutional:  Negative for fatigue and fever.   HENT:  Negative for congestion and sore throat.    Eyes:  Negative for photophobia, redness and visual disturbance.   Respiratory:  Negative for cough, shortness of breath and wheezing.    Cardiovascular:  Negative for chest pain, palpitations and leg swelling.   Gastrointestinal:  Negative for abdominal pain, diarrhea, nausea and vomiting.   Endocrine: Negative for polydipsia, polyphagia and polyuria.   Genitourinary:  Negative for dysuria, flank pain and hematuria.   Musculoskeletal:  Negative for arthralgias, gait problem, joint swelling and myalgias.   Skin:  Negative for color change, rash and wound.   Neurological:  Negative for " dizziness, syncope, light-headedness and confusion.   Psychiatric/Behavioral:  Negative for agitation, behavioral problems and decreased concentration. The patient is not nervous/anxious.         Physical Exam  Vitals reviewed.   Constitutional:       General: He is not in acute distress.     Appearance: Normal appearance. He is not ill-appearing, toxic-appearing or diaphoretic.   HENT:      Head: Normocephalic and atraumatic.   Eyes:      Conjunctiva/sclera: Conjunctivae normal.      Pupils: Pupils are equal, round, and reactive to light.   Cardiovascular:      Rate and Rhythm: Normal rate and regular rhythm.      Pulses: Normal pulses.      Heart sounds: Normal heart sounds.   Pulmonary:      Effort: Pulmonary effort is normal. No respiratory distress.      Breath sounds: Normal breath sounds.   Abdominal:      General: Bowel sounds are normal. There is no distension.      Palpations: Abdomen is soft. There is no mass.      Tenderness: There is no abdominal tenderness. There is no right CVA tenderness, left CVA tenderness, guarding or rebound.      Hernia: No hernia is present.   Musculoskeletal:         General: Normal range of motion.      Cervical back: Normal range of motion and neck supple.      Right lower leg: No edema.      Left lower leg: No edema.   Lymphadenopathy:      Cervical: No cervical adenopathy.   Skin:     General: Skin is warm and dry.      Capillary Refill: Capillary refill takes less than 2 seconds.      Findings: No rash.   Neurological:      General: No focal deficit present.      Mental Status: He is alert and oriented to person, place, and time.   Psychiatric:         Mood and Affect: Mood normal.         Behavior: Behavior normal.         Thought Content: Thought content normal.         Judgment: Judgment normal.        Result Review :                Assessment and Plan   Diagnoses and all orders for this visit:    1. Health maintenance examination (Primary)    2. Dyslipidemia  -      Comprehensive metabolic panel; Future  -     Lipid panel; Future             Follow Up   Return in about 1 year (around 4/22/2025).  Patient was given instructions and counseling regarding his condition or for health maintenance advice. Please see specific information pulled into the AVS if appropriate.

## 2024-04-22 NOTE — PATIENT INSTRUCTIONS
Work on a healthy diet; limit carbs and sweets; increase plan-based foods.  Exercise at least 150 minutes per week.

## 2025-04-07 ENCOUNTER — LAB (OUTPATIENT)
Dept: LAB | Facility: HOSPITAL | Age: 45
End: 2025-04-07
Payer: COMMERCIAL

## 2025-04-07 DIAGNOSIS — E78.2 MIXED HYPERLIPIDEMIA: ICD-10-CM

## 2025-04-07 DIAGNOSIS — E78.5 DYSLIPIDEMIA: ICD-10-CM

## 2025-04-07 DIAGNOSIS — R00.1 BRADYCARDIA: ICD-10-CM

## 2025-04-07 DIAGNOSIS — I49.5 SINUS NODE DYSFUNCTION: ICD-10-CM

## 2025-04-07 DIAGNOSIS — I49.5 SSS (SICK SINUS SYNDROME): ICD-10-CM

## 2025-04-07 DIAGNOSIS — I44.7 LBBB (LEFT BUNDLE BRANCH BLOCK): ICD-10-CM

## 2025-04-07 LAB
ALBUMIN SERPL-MCNC: 4.2 G/DL (ref 3.5–5.2)
ALBUMIN/GLOB SERPL: 1.2 G/DL
ALP SERPL-CCNC: 127 U/L (ref 39–117)
ALT SERPL W P-5'-P-CCNC: 17 U/L (ref 1–41)
ANION GAP SERPL CALCULATED.3IONS-SCNC: 10.4 MMOL/L (ref 5–15)
AST SERPL-CCNC: 18 U/L (ref 1–40)
BILIRUB SERPL-MCNC: 0.5 MG/DL (ref 0–1.2)
BUN SERPL-MCNC: 17 MG/DL (ref 6–20)
BUN/CREAT SERPL: 16 (ref 7–25)
CALCIUM SPEC-SCNC: 9.2 MG/DL (ref 8.6–10.5)
CHLORIDE SERPL-SCNC: 105 MMOL/L (ref 98–107)
CHOLEST SERPL-MCNC: 193 MG/DL (ref 0–200)
CO2 SERPL-SCNC: 25.6 MMOL/L (ref 22–29)
CREAT SERPL-MCNC: 1.06 MG/DL (ref 0.76–1.27)
EGFRCR SERPLBLD CKD-EPI 2021: 88.2 ML/MIN/1.73
GLOBULIN UR ELPH-MCNC: 3.5 GM/DL
GLUCOSE SERPL-MCNC: 88 MG/DL (ref 65–99)
HDLC SERPL-MCNC: 34 MG/DL (ref 40–60)
LDLC SERPL CALC-MCNC: 138 MG/DL (ref 0–100)
LDLC/HDLC SERPL: 4 {RATIO}
POTASSIUM SERPL-SCNC: 4.5 MMOL/L (ref 3.5–5.2)
PROT SERPL-MCNC: 7.7 G/DL (ref 6–8.5)
SODIUM SERPL-SCNC: 141 MMOL/L (ref 136–145)
TRIGL SERPL-MCNC: 115 MG/DL (ref 0–150)
VLDLC SERPL-MCNC: 21 MG/DL (ref 5–40)

## 2025-04-07 PROCEDURE — 80053 COMPREHEN METABOLIC PANEL: CPT

## 2025-04-07 PROCEDURE — 36415 COLL VENOUS BLD VENIPUNCTURE: CPT

## 2025-04-07 PROCEDURE — 80061 LIPID PANEL: CPT

## 2025-04-10 ENCOUNTER — OFFICE VISIT (OUTPATIENT)
Dept: CARDIOLOGY | Facility: CLINIC | Age: 45
End: 2025-04-10
Payer: COMMERCIAL

## 2025-04-10 VITALS
BODY MASS INDEX: 28.56 KG/M2 | SYSTOLIC BLOOD PRESSURE: 113 MMHG | DIASTOLIC BLOOD PRESSURE: 77 MMHG | HEIGHT: 71 IN | OXYGEN SATURATION: 96 % | HEART RATE: 72 BPM | WEIGHT: 204 LBS

## 2025-04-10 DIAGNOSIS — E78.5 DYSLIPIDEMIA: ICD-10-CM

## 2025-04-10 DIAGNOSIS — I49.5 SINUS NODE DYSFUNCTION: ICD-10-CM

## 2025-04-10 DIAGNOSIS — I44.7 LBBB (LEFT BUNDLE BRANCH BLOCK): ICD-10-CM

## 2025-04-10 DIAGNOSIS — R94.31 ABNORMAL EKG: Primary | ICD-10-CM

## 2025-04-10 DIAGNOSIS — G47.33 OSA ON CPAP: ICD-10-CM

## 2025-04-10 PROCEDURE — 99214 OFFICE O/P EST MOD 30 MIN: CPT | Performed by: INTERNAL MEDICINE

## 2025-04-10 PROCEDURE — 93000 ELECTROCARDIOGRAM COMPLETE: CPT | Performed by: INTERNAL MEDICINE

## 2025-04-10 NOTE — PROGRESS NOTES
Subjective:     Encounter Date:04/10/2025      Patient ID: Camron Rodriguez is a 45 y.o. male.    Chief Complaint and history of present illness:       SSS, abnormal EKG, left bundle branch block, Lyle, HELDER/CPAP.     History of Present Illness:       Mr. Camron Rodriguez has PMH of     Hyperlipidemia  Sinus node dysfunction,LBBB  Coronary CTA 7/21/2022: No CAD.  Calcium score 0.  HELDER/CPAP  Vasecto bradycardia, ectopic atrial rhythm, my  Family history of MI in maternal grandfather (age unknown) and colon cancer     Here for follow-up.  Patient previously was dizzy when he was taking metoprolol and EKG was showing ectopic atrial rhythm sinus node dysfunction and left bundle branch block.  Patient stopped beta-blockers and was feeling better.  Now with is here for follow-up.  Patient denies any chest pain shortness of breath dizziness or passing out spells.  Plays [employer] ball on a regular basis without symptoms.     Patient's arterial blood pressure is 113/73, heart rate 72 bpm, O2 sat of 96 % on room air.     Coronary CTA 7/22/2022 calcium score of 0, no disease.  Echocardiogram 6/7/2022 revealed normal LV systolic function with paradoxical septal motion from left bundle branch block, mild MR  Holter monitor 6/7/2022 revealed heart rates from  bpm, mean of 79 bpm.  Had an episode of tachycardia at 138 bpm with left bundle branch block.     Labs from from 12/6/2021 reveal lipid profile with cholesterol 191, triglycerides 263, HDL 29, .  Labs from 5/20/2022 revealed normal TSH at 1.2, normal CBC and CMP.  Labs from 3/31/2024 revealed CMP with a glucose of 102.  Lipid profile with cholesterol 181, triglycerides 191, HDL 33, .  Labs from 4/7/2025 reveal normal CMP.  Lipid profile with cholesterol 193, triglycerides 115, HDL 34, .     Assessment:  :     Sinus node dysfunction  Left bundle branch block  Bradycardia, ectopic atrial rhythm  Dyslipidemia  HELDER/CPAP        Recommendations / Plan:          Reviewed EKG results with patient.  Patient previously had left bundle today EKG does not reveal with left bundle by T wave inversion in anterior leads.  Patient is totally asymptomatic.  Patient states that he plays pickle ball without symptoms.  Reviewed labs including low HDL counseled on diet exercise.  Continue Crestor 20 mg daily.  Will check labs before visit.              ECG 12 Lead    Date/Time: 4/10/2025 3:35 PM  Performed by: Adeel Perez MD    Authorized by: Adeel Perez MD  Comparison: compared with previous ECG from 4/20/2024  Comparison to previous ECG: EKG done today reviewed/interpreted by me reveals sinus bradycardia at the rate of 58 bpm with possible ectopic atrial rhythm and T wave inversion in anterior leads consistent with ischemia.  Compared to EKG from 4/28/2024 patient does not have left bundle anymore.          ECHOCARDIOGRAM:  Results for orders placed during the hospital encounter of 06/03/22    Adult Transthoracic Echo Complete W/ Cont if Necessary Per Protocol    Interpretation Summary  Normal LV size.  Possible paradoxical septal motion with rest of the LV elicia normally  Estimated LVEF of 60%.  Normal RV size  Normal atrial size  Aortic valve,  Mitral valve, tricuspid valve appears structurally normal, mild mitral regurgitation seen.  No pericardial effusion seen.  Proximal aorta appears normal in size.      STRESS TEST          HEART CATHETERIZATION  No results found for this or any previous visit.      Copied text in this portion of the note has been reviewed and is accurate as of 4/10/2025  The following portions of the patient's history were reviewed and updated as appropriate: allergies, current medications, past family history, past medical history, past social history, past surgical history and problem list.    Assessment:         MDM       Diagnosis Plan   1. Abnormal EKG  Comprehensive Metabolic Panel    Lipid Panel    TSH      2.  "Dyslipidemia  Comprehensive Metabolic Panel    Lipid Panel    TSH      3. Sinus node dysfunction  Comprehensive Metabolic Panel    Lipid Panel    TSH      4. LBBB (left bundle branch block)  Comprehensive Metabolic Panel    Lipid Panel    TSH      5. HELDER on CPAP  Comprehensive Metabolic Panel    Lipid Panel    TSH             Plan:               Past Medical History:  Past Medical History:   Diagnosis Date    Acute pharyngitis     Anxiety     Macular degeneration     HELDER on CPAP     AHI:16.5    Stool contents finding, abnormal      Past Surgical History:  Past Surgical History:   Procedure Laterality Date    COLONOSCOPY      TYMPANOPLASTY      VASECTOMY        Allergies:  No Known Allergies  Home Meds:  Current Meds:     Current Outpatient Medications:     LUTEIN PO, Take  by mouth., Disp: , Rfl:     multivitamin (MULTIVITAMIN PO), Take  by mouth Daily., Disp: , Rfl:     rosuvastatin (CRESTOR) 20 MG tablet, Take 1 tablet by mouth Daily., Disp: 90 tablet, Rfl: 3  Social History:   Social History     Tobacco Use    Smoking status: Never     Passive exposure: Never    Smokeless tobacco: Never   Substance Use Topics    Alcohol use: Yes     Comment: social drinker       Family History:  Family History   Problem Relation Age of Onset    Diabetes Maternal Grandmother     Heart attack Maternal Grandfather         MI X2    Other Maternal Grandfather         CABG    Colon cancer Other     Prostate cancer Other               Review of Systems   Constitutional: Negative for malaise/fatigue.   Cardiovascular:  Negative for chest pain, leg swelling and palpitations.   Respiratory:  Negative for shortness of breath.    Skin:  Negative for rash.   Neurological:  Negative for dizziness, light-headedness and numbness.     All other systems are negative         Objective:     Physical Exam  /77   Pulse 72   Ht 180.3 cm (71\")   Wt 92.5 kg (204 lb)   SpO2 96%   BMI 28.45 kg/m²   General:  Appears in no acute distress  Eyes: " "Sclera is anicteric,  conjunctiva is clear   HEENT:  No JVD.  No carotid bruits  Respiratory: Respirations regular and unlabored at rest.  Clear to auscultation  Cardiovascular: S1,S2 Regular rate and rhythm. .   Extremities: No digital clubbing or cyanosis, no edema  Skin: Color pink. Skin warm and dry to touch. No rashes  No xanthoma  Neuro: Alert and awake.    Lab Reviewed:         Adeel Perez MD  4/10/2025 15:37 EDT      EMR Dragon/Transcription:   \"Dictated utilizing Dragon dictation\".        "

## 2025-04-23 ENCOUNTER — OFFICE VISIT (OUTPATIENT)
Dept: FAMILY MEDICINE CLINIC | Facility: CLINIC | Age: 45
End: 2025-04-23
Payer: COMMERCIAL

## 2025-04-23 VITALS
OXYGEN SATURATION: 96 % | BODY MASS INDEX: 29.26 KG/M2 | TEMPERATURE: 98.4 F | WEIGHT: 209 LBS | HEART RATE: 63 BPM | DIASTOLIC BLOOD PRESSURE: 82 MMHG | HEIGHT: 71 IN | SYSTOLIC BLOOD PRESSURE: 118 MMHG

## 2025-04-23 DIAGNOSIS — Z00.00 ENCOUNTER FOR WELL ADULT EXAM WITHOUT ABNORMAL FINDINGS: Primary | ICD-10-CM

## 2025-04-23 DIAGNOSIS — Z12.5 ENCOUNTER FOR SCREENING FOR MALIGNANT NEOPLASM OF PROSTATE: ICD-10-CM

## 2025-04-23 DIAGNOSIS — E78.5 DYSLIPIDEMIA: ICD-10-CM

## 2025-04-23 PROCEDURE — 99396 PREV VISIT EST AGE 40-64: CPT | Performed by: FAMILY MEDICINE

## 2025-04-23 NOTE — PROGRESS NOTES
"Subjective   Camron Rodriguez is a 45 y.o. male.     History of Present Illness  Camron Rodriguez is in for his annual well visit.  He does have high cholesterol that we medicate.  He does see cardiology for issues related to a left bundle branch block but he has not had any symptoms of late.  He does feel a little more fatigue but nothing excessive.  There is no history of chest pain or dyspnea. There is no history of issue with bowel or bladder dysfunction. There is no history of dizziness or lightheadedness. There is no history of issue with sleep or mood. There is no history of issue with present medication.              /82 (BP Location: Left arm, Patient Position: Sitting, Cuff Size: Large Adult)   Pulse 63   Temp 98.4 °F (36.9 °C) (Temporal)   Ht 180.3 cm (70.98\")   Wt 94.8 kg (209 lb)   SpO2 96%   BMI 29.16 kg/m²       Chief Complaint   Patient presents with    Annual Exam           Current Outpatient Medications:     LUTEIN PO, Take  by mouth., Disp: , Rfl:     multivitamin (MULTIVITAMIN PO), Take  by mouth Daily., Disp: , Rfl:     rosuvastatin (CRESTOR) 20 MG tablet, Take 1 tablet by mouth Daily., Disp: 90 tablet, Rfl: 3    BMI is >= 25 and <30. (Overweight) The following options were offered after discussion;: exercise counseling/recommendations and nutrition counseling/recommendations       The following portions of the patient's history were reviewed and updated as appropriate: allergies, current medications, past family history, past medical history, past social history, past surgical history, and problem list.    Review of Systems   Constitutional:  Positive for fatigue. Negative for activity change and fever.   HENT:  Negative for congestion, sinus pressure, sinus pain, sore throat and trouble swallowing.    Eyes:  Negative for visual disturbance.   Respiratory:  Negative for chest tightness, shortness of breath and wheezing.    Cardiovascular:  Negative for chest pain.   Gastrointestinal:  " Negative for abdominal distention, abdominal pain, constipation, diarrhea, nausea and vomiting.   Genitourinary:  Negative for difficulty urinating and dysuria.   Musculoskeletal:  Negative for back pain and neck pain.   Psychiatric/Behavioral:  Negative for agitation, hallucinations and suicidal ideas.        Objective   Physical Exam  Vitals and nursing note reviewed.   Constitutional:       Appearance: Normal appearance.   HENT:      Right Ear: Tympanic membrane, ear canal and external ear normal.      Left Ear: Tympanic membrane, ear canal and external ear normal.      Nose: No congestion.      Mouth/Throat:      Pharynx: No oropharyngeal exudate.   Eyes:      Conjunctiva/sclera: Conjunctivae normal.      Pupils: Pupils are equal, round, and reactive to light.   Cardiovascular:      Rate and Rhythm: Normal rate and regular rhythm.      Heart sounds: Normal heart sounds. No murmur heard.  Pulmonary:      Effort: Pulmonary effort is normal.      Breath sounds: No wheezing or rales.   Abdominal:      General: Bowel sounds are normal.      Palpations: Abdomen is soft.      Tenderness: There is no abdominal tenderness. There is no guarding.   Musculoskeletal:      Cervical back: Neck supple.      Right lower leg: No edema.      Left lower leg: No edema.   Lymphadenopathy:      Cervical: No cervical adenopathy.   Skin:     Findings: No rash.   Neurological:      General: No focal deficit present.      Mental Status: He is alert and oriented to person, place, and time.   Psychiatric:         Mood and Affect: Mood normal.           Assessment & Plan   Problems Addressed this Visit    None  Visit Diagnoses         Encounter for well adult exam without abnormal findings    -  Primary      Encounter for screening for malignant neoplasm of prostate        Relevant Orders    PSA SCREENING      Dyslipidemia        Relevant Orders    Lipid panel    Comprehensive metabolic panel    Testosterone (Free & Total), LC / MS           Diagnoses         Codes Comments      Encounter for well adult exam without abnormal findings    -  Primary ICD-10-CM: Z00.00  ICD-9-CM: V70.0       Encounter for screening for malignant neoplasm of prostate     ICD-10-CM: Z12.5  ICD-9-CM: V76.44       Dyslipidemia     ICD-10-CM: E78.5  ICD-9-CM: 272.4           He had labs done for cardiology that showed his lipids were a little higher than previous but his CMP was fine  He needs a PSA but he just had blood drawn so when I can do that today  I will bring him back for labs only in 6 months and have a PSA and testosterone level included with a lipid panel and metabolic  I may need to adjust his lipid treatment  I have asked him to include some regular exercise to try to improve his cardio conditioning  I have asked him to call with any new concerns in the interim  He had a colonoscopy in 2021 and will be due for 1 next year